# Patient Record
Sex: MALE | Race: WHITE | Employment: FULL TIME | URBAN - METROPOLITAN AREA
[De-identification: names, ages, dates, MRNs, and addresses within clinical notes are randomized per-mention and may not be internally consistent; named-entity substitution may affect disease eponyms.]

---

## 2017-12-08 ENCOUNTER — OFFICE VISIT (OUTPATIENT)
Dept: FAMILY MEDICINE | Facility: CLINIC | Age: 37
End: 2017-12-08
Payer: COMMERCIAL

## 2017-12-08 VITALS
RESPIRATION RATE: 24 BRPM | HEART RATE: 69 BPM | WEIGHT: 175 LBS | SYSTOLIC BLOOD PRESSURE: 128 MMHG | TEMPERATURE: 98.4 F | DIASTOLIC BLOOD PRESSURE: 82 MMHG | HEIGHT: 71 IN | BODY MASS INDEX: 24.5 KG/M2

## 2017-12-08 DIAGNOSIS — B07.0 PLANTAR WARTS: Primary | ICD-10-CM

## 2017-12-08 PROCEDURE — 17110 DESTRUCTION B9 LES UP TO 14: CPT | Performed by: PHYSICIAN ASSISTANT

## 2017-12-08 NOTE — MR AVS SNAPSHOT
After Visit Summary   12/8/2017    Vince Hatch    MRN: [id number]           Patient Information     Date Of Birth          1980        Visit Information        Provider Department      12/8/2017 1:40 PM Katlyn Ray PA-C [facility]        Care Instructions    Be aggressive! Cut away ALL tissue that doesn't hurt every night, then treat with a wart treatment (salicylic acid treatment), then cover. This will probably several months but will ultimately be successful.    You can also use duct tape: file or cut down all dead skin down to skin that bleeds, apply duct tape every night, take off in the morning, and repeat daily for at least 4 weeks (more likely longer).    For warts:  Mix 5 drops of [place] oil, 10 drops of lemon in 2 teaspoons of apple cider vinegar - apply twice daily until the wart is gone.    Return to clinic in (10 days - 4 weeks) for re-treatment if lesions fail to fully resolve.          Follow-ups after your visit        Who to contact     If you have questions or need follow up information about today's clinic visit or your schedule please contact [facility] directly at [phone].  Normal or non-critical lab and imaging results will be communicated to you by MyChart, letter or phone within 4 business days after the clinic has received the results. If you do not hear from us within 7 days, please contact the clinic through [employer] or phone. If you have a critical or abnormal lab result, we will notify you by phone as soon as possible.  Submit refill requests through [employer] or call your pharmacy and they will forward the refill request to us. Please allow 3 business days for your refill to be completed.          Additional Information About Your Visit        MyChart Information     [employer] Form gives you secure access to your electronic health record. If you see a primary care provider, you can also send messages to your care team and make  "appointments. If you have questions, please call your primary care clinic.  If you do not have a primary care provider, please call [phone] and they will assist you.        Care EveryWhere ID     This is your Care EveryWhere ID. This could be used by other organizations to access your [facility] medical records  [id number]        Your Vitals Were     Pulse Temperature Respirations Height BMI (Body Mass Index)       69 98.4  F (36.9  C) (Oral) 24 5' 11.25\" (1.81 m) 24.24 kg/m2        Blood Pressure from Last 3 Encounters:   12/08/17 128/82   04/06/16 122/80   02/17/15 134/82    Weight from Last 3 Encounters:   12/08/17 175 lb (79.4 kg)   04/06/16 178 lb (80.7 kg)   02/17/15 185 lb (83.9 kg)              Today, you had the following     No orders found for display       Primary Care Provider Office Phone # Fax #    Cici Avalos [phone] [address] [facility] [address]        Equal Access to Services     [facility]: Hadii cj ku hadasho Soyonis, waaxda luqadaha, qaybta kaalmada edwige, tracie dean . So [facility] [phone].    ATENCIÓN: Si habla español, tiene a mata disposición servicios gratuitos de asistencia lingüística. Comfort al [phone].    We comply with applicable federal civil rights laws and [place] laws. We do not discriminate on the basis of race, color, national origin, age, disability, sex, sexual orientation, or gender identity.            Thank you!     Thank you for choosing [facility]  for your care. Our goal is always to provide you with excellent care. Hearing back from our patients is one way we can continue to improve our services. Please take a few minutes to complete the written survey that you may receive in the mail after your visit with us. Thank you!             Your Updated Medication List - Protect others around you: Learn how to safely use, store and throw away your medicines at " www.disposemymeds.org.          This list is accurate as of: 12/8/17  2:00 PM.  Always use your most recent med list.                   Brand Name Dispense Instructions for use Diagnosis    amoxicillin 500 MG capsule    AMOXIL    30 capsule    Take 1 capsule (500 mg) by mouth 3 times daily    Acute recurrent maxillary sinusitis       cetirizine 10 MG tablet    zyrTEC    30 tablet    Take 1 tablet (10 mg) by mouth every evening        fish oil-omega-3 fatty acids 1000 MG capsule      Take 1,000 mg by mouth daily        fluticasone 50 MCG/ACT spray    FLONASE    1 Package    Spray 2 sprays into both nostrils daily    Seasonal allergic rhinitis       pseudoePHEDrine 30 MG tablet    SUDAFED    112 tablet    Take 1 tablet (30 mg) by mouth every 4 hours as needed for congestion

## 2017-12-08 NOTE — PROGRESS NOTES
SUBJECTIVE:   Vince Hatch is a 37 year old male who presents to clinic today for the following health issues:      WART(S)      Onset: 6 months    Description (location/number): right foot base of 2nd toe, total of 3    Accompanying signs and symptoms: Painful: YES- mild    History: prior warts: YES    Therapies tried and outcome: OTC freeze-does not help - last treated 2-3 weeks ago.        Problem list and histories reviewed & adjusted, as indicated.  Additional history: as documented    Patient Active Problem List   Diagnosis     Allergic state     Lumbosacral ligament sprain     Nonallopathic lesion of sacral region     CARDIOVASCULAR SCREENING; LDL GOAL LESS THAN 160     Hypertension goal BP (blood pressure) < 140/90     Hypertension     Racing heart beat     Palpitations     Past Surgical History:   Procedure Laterality Date     APPENDECTOMY  2000     ARTHROSCOPY KNEE Right 10/27/2014    Procedure: ARTHROSCOPY KNEE;  Surgeon: Germain Sanchez MD;  Location: [facility] OR     ARTHROSCOPY KNEE WITH MEDIAL MENISCECTOMY Right 10/27/2014    Procedure: ARTHROSCOPY KNEE WITH MEDIAL MENISCECTOMY;  Surgeon: Germain Sanchez MD;  Location: [facility] OR     COLONOSCOPY      Think in 2013     ENT SURGERY       GENITOURINARY SURGERY  2015    Kidney stone removal and stent       Social History   Substance Use Topics     Smoking status: Never Smoker     Smokeless tobacco: Never Used      Comment: socially/ocassionally      Alcohol use 0.0 oz/week      Comment: occ     Family History   Problem Relation Age of Onset     Aneurysm Paternal Grandfather 70     abdominal      Hypertension Father      Hyperlipidemia Father      Hypertension Maternal Grandmother      Heart Failure Maternal Grandmother      CANCER Maternal Grandfather          Current Outpatient Prescriptions   Medication Sig Dispense Refill     cetirizine (ZYRTEC) 10 MG tablet Take 1 tablet (10 mg) by mouth every evening 30 tablet 1     pseudoePHEDrine (SUDAFED) 30 MG  "tablet Take 1 tablet (30 mg) by mouth every 4 hours as needed for congestion 112 tablet      amoxicillin (AMOXIL) 500 MG capsule Take 1 capsule (500 mg) by mouth 3 times daily (Patient not taking: Reported on 12/8/2017) 30 capsule 0     fish oil-omega-3 fatty acids 1000 MG capsule Take 1,000 mg by mouth daily        fluticasone (FLONASE) 50 MCG/ACT nasal spray Spray 2 sprays into both nostrils daily (Patient not taking: Reported on 12/8/2017) 1 Package 11     No Known Allergies      Reviewed and updated as needed this visit by clinical staffTobacco  Allergies  Meds  Med Hx  Surg Hx  Fam Hx  Soc Hx      Reviewed and updated as needed this visit by Provider         ROS:  Constitutional, HEENT, cardiovascular, pulmonary, gi and gu systems are negative, except as otherwise noted.      OBJECTIVE:   /82 (BP Location: Left arm, Patient Position: Sitting, Cuff Size: Adult Regular)  Pulse 69  Temp 98.4  F (36.9  C) (Oral)  Resp 24  Ht 5' 11.25\" (1.81 m)  Wt 175 lb (79.4 kg)  BMI 24.24 kg/m2  Body mass index is 24.24 kg/(m^2).  GENERAL: healthy, alert and no distress  RESP: lungs clear to auscultation - no rales, rhonchi or wheezes  CV: regular rate and rhythm, normal S1 S2, no S3 or S4, no murmur, click or rub, no peripheral edema and peripheral pulses strong    WART:  3 dome shaped grey/brown hyperkeratotic lesion(s) with verrucous appearance, black dots on surface.  Located bottom of right foot.    PROCEDURE:  Lesions wiped with alcohol pad. Liquid nitrogen was applied for 10 seconds to the skin lesion(s) x 3 with intermediate thaw.  The expected redness, blistering or scabbing reaction was explained.  The patient was reminded of the risk of scarring from the procedure. Instructed to place duct tape over warts to help with resolution. The patient was instructed to return (10 days - 4 weeks) if lesions fail to fully resolve. Patient tolerated the procedure well.      Diagnostic Test Results:  none "     ASSESSMENT/PLAN:       ICD-10-CM    1. Plantar warts B07.0 DESTRUCT BENIGN LESION, UP TO 14       Patient Instructions   Be aggressive! Cut away ALL tissue that doesn't hurt every night, then treat with a wart treatment (salicylic acid treatment), then cover. This will probably several months but will ultimately be successful.    You can also use duct tape: file or cut down all dead skin down to skin that bleeds, apply duct tape every night, take off in the morning, and repeat daily for at least 4 weeks (more likely longer).    For warts:  Mix 5 drops of [place] oil, 10 drops of lemon in 2 teaspoons of apple cider vinegar - apply twice daily until the wart is gone.    Return to clinic in (10 days - 4 weeks) for re-treatment if lesions fail to fully resolve.      Katlyn Ray PA-C  [facility]

## 2017-12-08 NOTE — PATIENT INSTRUCTIONS
Be aggressive! Cut away ALL tissue that doesn't hurt every night, then treat with a wart treatment (salicylic acid treatment), then cover. This will probably several months but will ultimately be successful.    You can also use duct tape: file or cut down all dead skin down to skin that bleeds, apply duct tape every night, take off in the morning, and repeat daily for at least 4 weeks (more likely longer).    For warts:  Mix 5 drops of [place] oil, 10 drops of lemon in 2 teaspoons of apple cider vinegar - apply twice daily until the wart is gone.    Return to clinic in (10 days - 4 weeks) for re-treatment if lesions fail to fully resolve.

## 2018-01-29 ENCOUNTER — OFFICE VISIT (OUTPATIENT)
Dept: FAMILY MEDICINE | Facility: CLINIC | Age: 38
End: 2018-01-29
Payer: COMMERCIAL

## 2018-01-29 VITALS
OXYGEN SATURATION: 95 % | WEIGHT: 180 LBS | RESPIRATION RATE: 16 BRPM | HEART RATE: 80 BPM | HEIGHT: 71 IN | BODY MASS INDEX: 25.2 KG/M2 | TEMPERATURE: 97 F | DIASTOLIC BLOOD PRESSURE: 84 MMHG | SYSTOLIC BLOOD PRESSURE: 130 MMHG

## 2018-01-29 DIAGNOSIS — I10 HYPERTENSION GOAL BP (BLOOD PRESSURE) < 140/90: ICD-10-CM

## 2018-01-29 DIAGNOSIS — L30.9 DERMATITIS: Primary | ICD-10-CM

## 2018-01-29 PROCEDURE — 99213 OFFICE O/P EST LOW 20 MIN: CPT | Performed by: FAMILY MEDICINE

## 2018-01-29 NOTE — MR AVS SNAPSHOT
After Visit Summary   1/29/2018    Vince Hatch    MRN: [id number]           Patient Information     Date Of Birth          1980        Visit Information        Provider Department      1/29/2018 10:00 AM Cici Avalos MD [facility]        Today's Diagnoses     Dermatitis    -  1    Hypertension goal BP (blood pressure) < 140/90          Care Instructions    Try applying a small amount of over-the-counter strength hydrocortisone cream to the area under your eye twice daily for your rash. If this is not helping over the next few days, please schedule with dermatology or the skin care clinic. If getting worse, stop the cream and schedule with dermatology or skin care clinic. Avoid putting any other topical creams or lotions on the area except possibly aquaphor or vanicream for moisturizer.           Follow-ups after your visit        Additional Services     DERMATOLOGY REFERRAL       Your provider has referred you to:  N: [facility] [place] [phone]   [email or web address]    Please be aware that coverage of these services is subject to the terms and limitations of your health insurance plan.  Call member services at your health plan with any benefit or coverage questions.      Please bring the following with you to your appointment:    (1) Any X-Rays, CTs or MRIs which have been performed.  Contact the facility where they were done to arrange for  prior to your scheduled appointment.    (2) List of current medications  (3) This referral request   (4) Any documents/labs given to you for this referral            SKIN CARE REFERRAL       Your provider has referred you to: [facility]: [facility] [phone] (Dr. Manuel Nguyen Jr.)   [email or web address]  [facility]: [facility] [place] [phone]   [email or web address]     Please be  aware that coverage of these services is subject to the terms and limitations of your health insurance plan.  Please check with your insurance prior to the appointment to ensure appropriate coverage for any services considered cosmetic in nature or not medically necessary.    Please bring the following with you to your appointment:    (1) Any X-Rays, CTs or MRIs which have been performed.  Contact the facility where they were done to arrange for  prior to your scheduled appointment.  Any new CT, MRI or other procedures ordered by your specialist must be performed at a [facility] facility or coordinated by your clinic's referral office.  (2) List of current medications  (3) This referral request   (4) Any documents/labs given to you for this referral                  Who to contact     If you have questions or need follow up information about today's clinic visit or your schedule please contact [facility] directly at [phone].  Normal or non-critical lab and imaging results will be communicated to you by MyChart, letter or phone within 4 business days after the clinic has received the results. If you do not hear from us within 7 days, please contact the clinic through [employer] or phone. If you have a critical or abnormal lab result, we will notify you by phone as soon as possible.  Submit refill requests through [employer] or call your pharmacy and they will forward the refill request to us. Please allow 3 business days for your refill to be completed.          Additional Information About Your Visit        MyChart Information     [employer] gives you secure access to your electronic health record. If you see a primary care provider, you can also send messages to your care team and make appointments. If you have questions, please call your primary care clinic.  If you do not have a primary care provider, please call [phone] and they will assist you.        Care EveryWhere ID     This is your  "Care EveryWhere ID. This could be used by other organizations to access your [facility] medical records  [id number]        Your Vitals Were     Pulse Temperature Respirations Height Pulse Oximetry BMI (Body Mass Index)    80 97  F (36.1  C) (Tympanic) 16 5' 11.25\" (1.81 m) 95% 24.93 kg/m2       Blood Pressure from Last 3 Encounters:   01/29/18 130/84   12/08/17 128/82   04/06/16 122/80    Weight from Last 3 Encounters:   01/29/18 180 lb (81.6 kg)   12/08/17 175 lb (79.4 kg)   04/06/16 178 lb (80.7 kg)              We Performed the Following     DERMATOLOGY REFERRAL     SKIN CARE REFERRAL        Primary Care Provider Office Phone # Fax #    Cici Avalos [phone] [address]  [facility] [address]        Equal Access to Services     [facility]: Hadii aad ku hadasho Soomaali, waaxda luqadaha, qaybta kaalmada adeegyada, waxay idiin hayzoraidan francy dickinsonn . So [facility] [phone].    ATENCIÓN: Si habla español, tiene a mata disposición servicios gratuitos de asistencia lingüística. Comfort al [phone].    We comply with applicable federal civil rights laws and [place] laws. We do not discriminate on the basis of race, color, national origin, age, disability, sex, sexual orientation, or gender identity.            Thank you!     Thank you for choosing [facility]  for your care. Our goal is always to provide you with excellent care. Hearing back from our patients is one way we can continue to improve our services. Please take a few minutes to complete the written survey that you may receive in the mail after your visit with us. Thank you!             Your Updated Medication List - Protect others around you: Learn how to safely use, store and throw away your medicines at www.disposemymeds.org.          This list is accurate as of 1/29/18 10:31 AM.  Always use your most recent med list.                   Brand Name Dispense Instructions for use Diagnosis    cetirizine " 10 MG tablet    zyrTEC    30 tablet    Take 1 tablet (10 mg) by mouth every evening        fish oil-omega-3 fatty acids 1000 MG capsule      Take 1,000 mg by mouth daily        fluticasone 50 MCG/ACT spray    FLONASE    1 Package    Spray 2 sprays into both nostrils daily    Seasonal allergic rhinitis       pseudoePHEDrine 30 MG tablet    SUDAFED    112 tablet    Take 1 tablet (30 mg) by mouth every 4 hours as needed for congestion

## 2018-01-29 NOTE — PROGRESS NOTES
SUBJECTIVE:   Vince Hatch is a 37 year old male who presents to clinic today for the following health issues:      Rash  Onset: couple weeks     Description:   Location: under right eye   Character: round, raised, red  Itching (Pruritis): YES    Progression of Symptoms:  same    Accompanying Signs & Symptoms:  Fever: no   Body aches or joint pain: no   Sore throat symptoms: no   Recent cold symptoms: YES    History:   Previous similar rash: no     Precipitating factors:   Exposure to similar rash: no   New exposures: None     Alleviating factors:  None     Therapies Tried and outcome: none         Three weeks ago his right eye began to feel dry and formed bumps that are red below it. He says that the swelling fluctuates under eye. He describes them as pimples but they do not form a white head. He says that 6 months ago his wife changed products to ones that are gluten-free because she has Celiac's disease and he began using those, but he has tried to keep them away from his mouth. The last few days, he has began using a oily lotion for the dryness under his eye, which has helped with the dryness. He says that the area sometimes feels itchy.      Problem list and histories reviewed & adjusted, as indicated.  Additional history: as documented    Patient Active Problem List   Diagnosis     Allergic state     Lumbosacral ligament sprain     Nonallopathic lesion of sacral region     CARDIOVASCULAR SCREENING; LDL GOAL LESS THAN 160     Hypertension goal BP (blood pressure) < 140/90     Hypertension     Racing heart beat     Palpitations     Past Surgical History:   Procedure Laterality Date     APPENDECTOMY  2000     ARTHROSCOPY KNEE Right 10/27/2014    Procedure: ARTHROSCOPY KNEE;  Surgeon: Germain Sanchez MD;  Location:  OR     ARTHROSCOPY KNEE WITH MEDIAL MENISCECTOMY Right 10/27/2014    Procedure: ARTHROSCOPY KNEE WITH MEDIAL MENISCECTOMY;  Surgeon: Germain Sanchez MD;  Location:  OR     COLONOSCOPY       [facility] in 2013     ENT SURGERY       GENITOURINARY SURGERY  2015    Kidney stone removal and stent       Social History   Substance Use Topics     Smoking status: Never Smoker     Smokeless tobacco: Never Used      Comment: socially/ocassionally      Alcohol use 0.0 oz/week      Comment: occ     Family History   Problem Relation Age of Onset     Aneurysm Paternal Grandfather 70     abdominal      Hypertension Father      Hyperlipidemia Father      Hypertension Maternal Grandmother      Heart Failure Maternal Grandmother      CANCER Maternal Grandfather          Current Outpatient Prescriptions   Medication Sig Dispense Refill     cetirizine (ZYRTEC) 10 MG tablet Take 1 tablet (10 mg) by mouth every evening 30 tablet 1     pseudoePHEDrine (SUDAFED) 30 MG tablet Take 1 tablet (30 mg) by mouth every 4 hours as needed for congestion 112 tablet      fish oil-omega-3 fatty acids 1000 MG capsule Take 1,000 mg by mouth daily        fluticasone (FLONASE) 50 MCG/ACT nasal spray Spray 2 sprays into both nostrils daily (Patient not taking: Reported on 12/8/2017) 1 Package 11     No Known Allergies  Recent Labs   Lab Test  06/22/15   1210  02/07/15   0630  12/15/14   1215  12/02/14   1212  04/23/12   1154   10/17/11   1132   LDL   --    --   136*   --    --    --   110   HDL   --    --   70   --    --    --   72   TRIG   --    --   71   --    --    --   56   ALT   --    --    --   33  19   --    --    CR   --   168  1.04  1.06  1.18   < >   --    GFRESTIMATED   --    --   81  79  72   < >   --    GFRESTBLACK   --    --   >90   GFR Calc    >90   GFR Calc    87   < >   --    POTASSIUM  4.3   --   4.8  4.6  4.2   < >   --    TSH   --    --    --   1.70   --    --    --     < > = values in this interval not displayed.      BP Readings from Last 3 Encounters:   01/29/18 130/84   12/08/17 128/82   04/06/16 122/80    Wt Readings from Last 3 Encounters:   01/29/18 81.6 kg (180 lb)   12/08/17 79.4 kg  "(175 lb)   04/06/16 80.7 kg (178 lb)        Reviewed and updated as needed this visit by clinical staff  Tobacco  Allergies  Meds  Med Hx  Surg Hx  Fam Hx  Soc Hx      Reviewed and updated as needed this visit by Provider       ROS:  See above.    This document serves as a record of the services and decisions personally performed and made by Cici Avalos MD. It was created on his/her behalf by Nathan Martin, trained medical scribe. The creation of this document is based the provider's statements to the medical scribes.    Scribe Nathan Martin, January 29, 2018    OBJECTIVE:     /84  Pulse 80  Temp 97  F (36.1  C) (Tympanic)  Resp 16  Ht 1.81 m (5' 11.25\")  Wt 81.6 kg (180 lb)  SpO2 95%  BMI 24.93 kg/m2  Body mass index is 24.93 kg/(m^2).  GENERAL: healthy, alert and no distress  EYES: Eyes grossly normal to inspection, PERRL and conjunctivae and sclerae normal  SKIN: cluster of tiny pink papules is present below lower left eyelid, none at the lid line. No scale present. No vesicles.     Diagnostic Test Results:  none     ASSESSMENT/PLAN:   1. Dermatitis  See pt instructions for recommendations reviewed with pt. Referral given to derm/skin care clinic if symptoms do not improve or if worsening. No clear trigger, but did start using new facial cream about 6 months ago, which he stopped after the rash appeared.     2. Hypertension goal BP (blood pressure) < 140/90  Controlled       Patient Instructions   Try applying a small amount of over-the-counter strength hydrocortisone cream to the area under your eye twice daily for your rash. If this is not helping over the next few days, please schedule with dermatology or the skin care clinic. If getting worse, stop the cream and schedule with dermatology or skin care clinic. Avoid putting any other topical creams or lotions on the area except possibly aquaphor or vanicream for moisturizer.       The information in this document, created by the " medical scribe for me, accurately reflects the services I personally performed and the decisions made by me. I have reviewed and approved this document for accuracy. 01/29/18    Cici Avalos MD  [facility]

## 2018-08-14 NOTE — PATIENT INSTRUCTIONS
Try applying a small amount of over-the-counter strength hydrocortisone cream to the area under your eye twice daily for your rash. If this is not helping over the next few days, please schedule with dermatology or the skin care clinic. If getting worse, stop the cream and schedule with dermatology or skin care clinic. Avoid putting any other topical creams or lotions on the area except possibly aquaphor or vanicream for moisturizer.    coffee

## 2019-12-15 ENCOUNTER — HEALTH MAINTENANCE LETTER (OUTPATIENT)
Age: 39
End: 2019-12-15

## 2020-12-04 ENCOUNTER — TRANSFERRED RECORDS (OUTPATIENT)
Dept: HEALTH INFORMATION MANAGEMENT | Facility: CLINIC | Age: 40
End: 2020-12-04

## 2021-03-15 ENCOUNTER — OFFICE VISIT (OUTPATIENT)
Dept: FAMILY MEDICINE | Facility: CLINIC | Age: 41
End: 2021-03-15
Payer: COMMERCIAL

## 2021-03-15 VITALS
OXYGEN SATURATION: 98 % | HEIGHT: 71 IN | BODY MASS INDEX: 27.4 KG/M2 | DIASTOLIC BLOOD PRESSURE: 125 MMHG | SYSTOLIC BLOOD PRESSURE: 210 MMHG | HEART RATE: 98 BPM | WEIGHT: 195.7 LBS | TEMPERATURE: 98.8 F

## 2021-03-15 DIAGNOSIS — I10 ESSENTIAL HYPERTENSION: Primary | ICD-10-CM

## 2021-03-15 LAB
ALBUMIN SERPL-MCNC: 4.5 G/DL (ref 3.4–5)
ALP SERPL-CCNC: 100 U/L (ref 40–150)
ALT SERPL W P-5'-P-CCNC: 51 U/L (ref 0–70)
ANION GAP SERPL CALCULATED.3IONS-SCNC: 7 MMOL/L (ref 3–14)
AST SERPL W P-5'-P-CCNC: 24 U/L (ref 0–45)
BILIRUB SERPL-MCNC: 0.7 MG/DL (ref 0.2–1.3)
BUN SERPL-MCNC: 19 MG/DL (ref 7–30)
CALCIUM SERPL-MCNC: 9.8 MG/DL (ref 8.5–10.1)
CHLORIDE SERPL-SCNC: 102 MMOL/L (ref 94–109)
CO2 SERPL-SCNC: 27 MMOL/L (ref 20–32)
CREAT SERPL-MCNC: 1.1 MG/DL (ref 0.66–1.25)
GFR SERPL CREATININE-BSD FRML MDRD: 82 ML/MIN/{1.73_M2}
GLUCOSE SERPL-MCNC: 100 MG/DL (ref 70–99)
POTASSIUM SERPL-SCNC: 4.2 MMOL/L (ref 3.4–5.3)
PROT SERPL-MCNC: 8.3 G/DL (ref 6.8–8.8)
SODIUM SERPL-SCNC: 136 MMOL/L (ref 133–144)
TSH SERPL DL<=0.005 MIU/L-ACNC: 2.02 MU/L (ref 0.4–4)

## 2021-03-15 PROCEDURE — 80050 GENERAL HEALTH PANEL: CPT | Performed by: PHYSICIAN ASSISTANT

## 2021-03-15 PROCEDURE — 36415 COLL VENOUS BLD VENIPUNCTURE: CPT | Performed by: PHYSICIAN ASSISTANT

## 2021-03-15 PROCEDURE — 99204 OFFICE O/P NEW MOD 45 MIN: CPT | Performed by: PHYSICIAN ASSISTANT

## 2021-03-15 RX ORDER — LISINOPRIL 10 MG/1
20 TABLET ORAL DAILY
Qty: 60 TABLET | Refills: 1 | Status: SHIPPED | OUTPATIENT
Start: 2021-03-15 | End: 2021-03-15

## 2021-03-15 RX ORDER — CHLORTHALIDONE 25 MG/1
25 TABLET ORAL DAILY
Qty: 30 TABLET | Refills: 1 | Status: SHIPPED | OUTPATIENT
Start: 2021-03-15 | End: 2021-05-10

## 2021-03-15 ASSESSMENT — MIFFLIN-ST. JEOR: SCORE: 1818.78

## 2021-03-15 NOTE — PATIENT INSTRUCTIONS
Patient Education     Prevention Guidelines, Men Ages 40 to 49  Screening tests and vaccines are an important part of managing your health. A screening test is done to find possible disorders or diseases in people who don't have any symptoms. The goal is to find a disease early so lifestyle changes can be made and you can be watched more closely to reduce the risk of disease, or to detect it early enough to treat it most effectively. Screening tests are not considered diagnostic, but are used to determine if more testing is needed. Health counseling is essential, too. Below are guidelines for these, for men ages 40 to 49. Talk with your healthcare provider to make sure you re up to date on what you need.  Screening Who needs it How often   Alcohol misuse All men in this age group At routine exams   Blood pressure All men in this age group Yearly checkup if your blood pressure reading is normal  Normal blood pressure is less than 120/80 mm Hg  If your blood pressure is higher than normal, follow the advice of your healthcare provider      Depression All men in this age group At routine exams   Type 2 diabetes or prediabetes All men beginning at age 45 and men  without symptoms at any age who are overweight or obese and have 1 or more other risk factors for diabetes At least every 3 years (yearly if blood sugar has begun to rise)   Type 2 diabetes All men with prediabetes Every year   Hepatitis C Men at increased risk for infection - talk with your healthcare provider At routine exams   High cholesterol or triglycerides All men ages 35 and older, and younger men at high risk for coronary artery disease At least every 5 years   HIV All men At routine exams   Obesity All men in this age group At routine exams   Prostate cancer Starting at age 45, talk to healthcare provider about risks and benefits of digital rectal exam (NABILA) and prostate-specific antigen (PSA) screening1 At routine exams   Syphilis Men at increased  risk for infection - talk with your healthcare provider At routine exams   Tuberculosis Men at increased risk for infection - talk with your healthcare provider Check with your healthcare provider   Vision All men in this age group Every 2 to 4 years if no risk factors for eye disease2   Vaccine Who needs it How often   Chickenpox (varicella) All men in this age group who have no record of this infection or vaccine 2 doses; the second dose should be given at least 4 weeks after the first dose   Hepatitis A Men at increased risk for infection - talk with your healthcare provider 2 doses given at least 6 months apart   Hepatitis B Men at increased risk for infection - talk with your healthcare provider 3 doses over 6 months; second dose should be given 1 month after the first dose; the third dose should be given at least 2 months after the second dose and at least 4 months after the first dose   Haemophilus influenzae Type B (HIB) Men at increased risk for infection - talk with your healthcare provider 1 to 3 doses   Influenza (flu) All men in this age group Once a year   Measles, mumps, rubella (MMR) All men in this age group who have no record of these infections or vaccines 1 or 2 doses   Meningococcal Men at increased risk for infection - talk with your healthcare provider 1 or more doses   Pneumococcal conjugate vaccine (PCV13) and pneumococcal polysaccharide vaccine (PPSV23) Men at increased risk for infection - talk with your healthcare provider PCV13: 1 dose ages 19 to 65 (protects against 13 types of pneumococcal bacteria)     PPSV23: 1 to 2 doses through age 64, or 1 dose at 65 or older (protects against 23 types of pneumococcal bacteria)      Tetanus/diphtheria/  pertussis (Td/Tdap) booster All men in this age group Td every 10 years, or a one-time dose of Tdap instead of a Td booster after age 18, then Td every 10 years   Counseling Who needs it How often   Diet and exercise Men who are overweight or obese  When diagnosed, and then at routine exams   Sexually transmitted infection prevention Men at increased risk for infection - talk with your healthcare provider At routine exams   Use of daily aspirin Men ages 45 to 79 at risk for cardiovascular health problems At routine exams   Use of tobacco and the health effects it can cause All men in this age group Every exam   [address] [place] [address] [facility]   [facility] [employer]  [employer] last reviewed this educational content on 2/1/2017 2000-2020 [employer]. All rights reserved. This information is not intended as a substitute for professional medical care. Always follow your healthcare professional's instructions.           Patient Education     Discharge Instructions for High Blood Pressure (Hypertension)  You have been diagnosed with high blood pressure (hypertension). This means the force of blood against your artery walls is too strong. It also means your heart is working hard to move blood. High blood pressure usually has no symptoms, but over time, it can cause serious health problems. High blood pressure raises your risk for heart attack, stroke, heart disease, heart failure, kidney disease, and vision loss. With help from your doctor, you can manage your blood pressure and protect your health.  Blood pressure measurements are given as 2 numbers. Systolic blood pressure is the upper number. This is the pressure when the heart contracts or pumps. Diastolic blood pressure is the lower number. This is the pressure when the heart relaxes between beats.  Blood pressure is categorized as normal, elevated, or stage 1 or stage 2 high blood pressure:    Normal blood pressure is systolic of less than 120 and diastolic of less than 80 (120/80) at rest    Elevated blood pressure is systolic of 120 to 129 and diastolic less than 80 at rest    Stage 1 high blood pressure is systolic is 130 to 139 or diastolic between 80 to 89 at  rest    Stage 2 high blood pressure is when systolic is 140 or higher or the diastolic is 90 or higher at rest  Taking medicine    Learn to measure your own blood pressure. Keep a record of your results. Ask your doctor which readings mean that you need medical attention.    Take your blood pressure medicine exactly as directed. Don t skip doses. Missing doses can cause your blood pressure to get out of control.    If you do miss a dose (or doses) check with your healthcare provider about what to do.    Don't take medicines that contain heart stimulants, including over-the-counter medicines. Check for warnings about high blood pressure on the label. Ask the pharmacist before purchasing something you haven't used before    Check with your doctor or pharmacist before taking a decongestant such as pseudoephedrine or phenylephrine. Some decongestants can worsen high blood pressure.    Lifestyle changes    Stay at a healthy weight. Get help to lose any extra pounds (kilograms). Often times meeting with a dietitian can help you identify changes that can be made to your diet to help with weight loss.    Cut back on salt.  ? Limit canned, dried, packaged, and fast foods.  ? Don t add salt to your food at the table.  ? Season foods with herbs instead of salt when you cook.  ? Request no added salt when you go to a restaurant.  ? The American Heart Association (AHA) says the ideal amount of sodium is no more than 1,500 mg a day.  But because Americans eat so much salt, you can make a positive change by cutting back to even 2,300 mg of sodium a day (1 teaspoon).     Follow the DASH (Dietary Approaches to Stop Hypertension) eating plan. This plan recommends vegetables, fruits, whole gains, and other heart healthy foods.    Eat food rich in potassium.    Begin an exercise program. Ask your healthcare provider how to get started. The AHA recommends aerobic exercise 3 to 4 times a week for an average of 40 minutes at a time to  lower blood pressure, with your provider's approval. Simple activities such as walking or gardening can help.    Break the smoking habit. Enroll in a stop-smoking program to improve your chances of success. Ask your healthcare provider about programs and medicines to help you stop smoking.    Limit drinks that contain caffeine such as coffee, black or green tea, and cola to 2 per day.    Never take stimulants such as amphetamines or cocaine. These drugs can be deadly for someone with high blood pressure.    Control your stress. Learn ways to manage stress.    Limit alcohol to no more than 1 drink a day for women and 2 drinks a day for men.    Follow-up care  Make a follow-up appointment as directed.  When to call your healthcare provider  Call your healthcare provider right away if you have any of the following:    Chest pain or shortness of breath ( call 911)    Moderate to severe headache    Weakness in the muscles of your face, arms, or legs    Trouble speaking    Extreme drowsiness    Confusion    Fainting or dizziness    Pulsating or rushing sound in your ears    Unexplained nosebleed    Weakness, tingling, or numbness of your face, arms, or legs    Change in vision    Blood pressure measured at home that is greater than 180/110  [employer] last reviewed this educational content on 8/1/2019 2000-2020 [employer]. All rights reserved. This information is not intended as a substitute for professional medical care. Always follow your healthcare professional's instructions.

## 2021-03-15 NOTE — PROGRESS NOTES
Chief Complaint   Patient presents with     Hypertension     blood plassure was this morning 180/120.       ASSESSMENT/PLAN:  Vince was seen today for hypertension.    Diagnoses and all orders for this visit:    Essential hypertension  -     Discontinue: lisinopril (ZESTRIL) 10 MG tablet; Take 2 tablets (20 mg) by mouth daily Start by taking 1 tablet for 7 days then 2 tablets if blood pressure is not within desired range  -     CBC with platelets differential  -     Comprehensive metabolic panel  -     TSH with free T4 reflex  -     chlorthalidone (HYGROTON) 25 MG tablet; Take 1 tablet (25 mg) by mouth daily    Patient is currently asymptomatic with respect to warning signs and hypertension.  Drawing labs to check for endorgan damage and before starting medication today.  We discussed first-line treatments including diuretics, ACE inhibitors and his previous medications.  Did not tolerate lisinopril and we will start him on chlorthalidone as this does have better kinetics than hydrochlorothiazide but had a significant change in price.  Discussed patient will likely need an increase in this medication dosage or addition of another agent until he is able to  improve his lifestyle but he will follow-up with his primary care provider in the next few weeks for further management.  He was given strict precautions that if he develops any new or worsening symptoms he is to go to the ER return to clinic immediately.  We will update him with his lab work    40 minutes spent on the date of the encounter doing chart review, history and exam, documentation and further activities as noted above    The plan of care was discussed with the patient. They understand and agree with the course of treatment prescribed. A printed summary was given including instructions and medications.    SUBJECTIVE:  Vince is a 40 year old male with a history of high blood pressure not currently taking any medication but has been on hydrochlorothiazide,  "chlorothiazide and amlodipine and tried lisinopril did not tolerate who presents to urgent care with elevated blood pressure.  This morning he woke up and had a loose stool and has felt a little off since then.  He denies any headache, nausea, vomiting, abdominal pain, diarrhea, dizziness, lightheadedness, shortness of breath or chest pain.  He states he has not been very active in his diet has not been as good as it could have been the last few months.  He took his blood pressure and it was 180 systolic and was still elevated but he took it a few minutes later.    ROS: Pertinent ROS neg other than the symptoms noted above in the HPI.     OBJECTIVE:  BP (!) 210/125   Pulse 98   Temp 98.8  F (37.1  C) (Oral)   Ht 1.81 m (5' 11.25\")   Wt 88.8 kg (195 lb 11.2 oz)   SpO2 98%   BMI 27.10 kg/m     GENERAL: healthy, alert and no distress  EYES: Eyes grossly normal to inspection  NECK: No carotid bruits  RESP: lungs clear to auscultation - no rales, rhonchi or wheezes  CV: regular rate and rhythm, normal S1 S2, no S3 or S4, no murmur, click or rub, no peripheral edema and peripheral pulses strong  ABDOMEN: soft, nontender, no masses and bowel sounds normal, no renal artery bruits  MS: no gross musculoskeletal defects noted, no edema    DIAGNOSTICS    Results for orders placed or performed in visit on 03/15/21   CBC with platelets differential     Status: Abnormal   Result Value Ref Range    WBC 4.5 4.0 - 11.0 10e9/L    RBC Count 5.32 4.4 - 5.9 10e12/L    Hemoglobin 17.6 13.3 - 17.7 g/dL    Hematocrit 51.5 40.0 - 53.0 %    MCV 97 78 - 100 fl    MCH 33.1 (H) 26.5 - 33.0 pg    MCHC 34.2 31.5 - 36.5 g/dL    RDW 12.2 10.0 - 15.0 %    Platelet Count 323 150 - 450 10e9/L    Diff Method Automated Method    Comprehensive metabolic panel     Status: Abnormal   Result Value Ref Range    Sodium 136 133 - 144 mmol/L    Potassium 4.2 3.4 - 5.3 mmol/L    Chloride 102 94 - 109 mmol/L    Carbon Dioxide 27 20 - 32 mmol/L    Anion Gap 7 " 3 - 14 mmol/L    Glucose 100 (H) 70 - 99 mg/dL    Urea Nitrogen 19 7 - 30 mg/dL    Creatinine 1.10 0.66 - 1.25 mg/dL    GFR Estimate 82 >60 mL/min/[1.73_m2]    GFR Estimate If Black >90 >60 mL/min/[1.73_m2]    Calcium 9.8 8.5 - 10.1 mg/dL    Bilirubin Total 0.7 0.2 - 1.3 mg/dL    Albumin 4.5 3.4 - 5.0 g/dL    Protein Total 8.3 6.8 - 8.8 g/dL    Alkaline Phosphatase 100 40 - 150 U/L    ALT 51 0 - 70 U/L    AST 24 0 - 45 U/L   TSH with free T4 reflex     Status: None   Result Value Ref Range    TSH 2.02 0.40 - 4.00 mU/L        Current Outpatient Medications   Medication     cetirizine (ZYRTEC) 10 MG tablet     fish oil-omega-3 fatty acids 1000 MG capsule     fluticasone (FLONASE) 50 MCG/ACT nasal spray     pseudoePHEDrine (SUDAFED) 30 MG tablet     No current facility-administered medications for this visit.       Patient Active Problem List   Diagnosis     Allergic state     Lumbosacral ligament sprain     Nonallopathic lesion of sacral region     CARDIOVASCULAR SCREENING; LDL GOAL LESS THAN 160     Hypertension goal BP (blood pressure) < 140/90     Hypertension     Racing heart beat     Palpitations      Past Medical History:   Diagnosis Date     Hypertension      Hypertension goal BP (blood pressure) < 140/90 12/8/2014     Palpitations      Racing heart beat      Uncomplicated asthma      Past Surgical History:   Procedure Laterality Date     APPENDECTOMY  2000     ARTHROSCOPY KNEE Right 10/27/2014    Procedure: ARTHROSCOPY KNEE;  Surgeon: Germain Sanchez MD;  Location: [facility] OR     ARTHROSCOPY KNEE WITH MEDIAL MENISCECTOMY Right 10/27/2014    Procedure: ARTHROSCOPY KNEE WITH MEDIAL MENISCECTOMY;  Surgeon: Germain Sanchez MD;  Location: [facility] OR     COLONOSCOPY      Think in 2013     ENT SURGERY       GENITOURINARY SURGERY  2015    Kidney stone removal and stent     Family History   Problem Relation Age of Onset     Aneurysm Paternal Grandfather 70        abdominal      Hypertension Father      Hyperlipidemia  Father      Hypertension Maternal Grandmother      Heart Failure Maternal Grandmother      Cancer Maternal Grandfather      Social History     Tobacco Use     Smoking status: Never Smoker     Smokeless tobacco: Never Used     Tobacco comment: socially/ocassionally    Substance Use Topics     Alcohol use: Yes     Alcohol/week: 0.0 standard drinks     Comment: jairon Brandon PA-C    The use of Dragon/Digital Path dictation services may have been used to construct the content in this note; any grammatical or spelling errors are non-intentional. Please contact the author of this note directly if you are in need of any clarification.

## 2021-03-16 LAB
BASOPHILS # BLD AUTO: 0 10E9/L (ref 0–0.2)
BASOPHILS NFR BLD AUTO: 1 %
DIFFERENTIAL METHOD BLD: ABNORMAL
EOSINOPHIL # BLD AUTO: 0 10E9/L (ref 0–0.7)
EOSINOPHIL NFR BLD AUTO: 1 %
ERYTHROCYTE [DISTWIDTH] IN BLOOD BY AUTOMATED COUNT: 12.2 % (ref 10–15)
HCT VFR BLD AUTO: 51.5 % (ref 40–53)
HGB BLD-MCNC: 17.6 G/DL (ref 13.3–17.7)
LYMPHOCYTES # BLD AUTO: 1.1 10E9/L (ref 0.8–5.3)
LYMPHOCYTES NFR BLD AUTO: 24 %
MCH RBC QN AUTO: 33.1 PG (ref 26.5–33)
MCHC RBC AUTO-ENTMCNC: 34.2 G/DL (ref 31.5–36.5)
MCV RBC AUTO: 97 FL (ref 78–100)
MONOCYTES # BLD AUTO: 0.4 10E9/L (ref 0–1.3)
MONOCYTES NFR BLD AUTO: 9 %
NEUTROPHILS # BLD AUTO: 3 10E9/L (ref 1.6–8.3)
NEUTROPHILS NFR BLD AUTO: 65 %
PLATELET # BLD AUTO: 323 10E9/L (ref 150–450)
RBC # BLD AUTO: 5.32 10E12/L (ref 4.4–5.9)
WBC # BLD AUTO: 4.5 10E9/L (ref 4–11)

## 2021-03-26 ENCOUNTER — OFFICE VISIT (OUTPATIENT)
Dept: FAMILY MEDICINE | Facility: CLINIC | Age: 41
End: 2021-03-26
Payer: COMMERCIAL

## 2021-03-26 VITALS
BODY MASS INDEX: 27.02 KG/M2 | OXYGEN SATURATION: 98 % | HEIGHT: 71 IN | DIASTOLIC BLOOD PRESSURE: 110 MMHG | HEART RATE: 74 BPM | WEIGHT: 193 LBS | SYSTOLIC BLOOD PRESSURE: 172 MMHG | TEMPERATURE: 97.3 F

## 2021-03-26 DIAGNOSIS — Z82.49 FAMILY HISTORY OF ESSENTIAL HYPERTENSION: ICD-10-CM

## 2021-03-26 DIAGNOSIS — I10 ESSENTIAL HYPERTENSION: Primary | ICD-10-CM

## 2021-03-26 DIAGNOSIS — J45.909 UNCOMPLICATED ASTHMA, UNSPECIFIED ASTHMA SEVERITY, UNSPECIFIED WHETHER PERSISTENT: ICD-10-CM

## 2021-03-26 DIAGNOSIS — J30.2 SEASONAL ALLERGIC RHINITIS, UNSPECIFIED TRIGGER: ICD-10-CM

## 2021-03-26 DIAGNOSIS — E66.3 OVERWEIGHT: ICD-10-CM

## 2021-03-26 PROCEDURE — 99204 OFFICE O/P NEW MOD 45 MIN: CPT | Performed by: INTERNAL MEDICINE

## 2021-03-26 RX ORDER — AMLODIPINE BESYLATE 10 MG/1
10 TABLET ORAL DAILY
Qty: 90 TABLET | Refills: 3 | Status: SHIPPED | OUTPATIENT
Start: 2021-03-26 | End: 2022-03-11

## 2021-03-26 ASSESSMENT — MIFFLIN-ST. JEOR: SCORE: 1806.53

## 2021-03-26 NOTE — PROGRESS NOTES
Pat Clarke is a 40 year old who presents for the following health issues     HPI       Chief Complaint   Patient presents with     Hypertension       HPI:   Hypertension      Duration:     Since: chronic           Specific cause: overweight, positive family history of hypertension     Description:      Location of pain: N/A     Character of pain: N/A     Pain radiation: N/A    Intensity: worsening over the past several months    History:      symptoms interferes with job: no     History of similar problems: yes     Any previous MRI or X-rays: N/A     Therapies tried without relief: chlorthalidone    Alleviating factors:      Improved by: BP medications    Precipitating factors:    Worsened by: high salt intake in the diet    Accompanying Signs & Symptoms: none            Current Medications:     Current Outpatient Medications   Medication Sig Dispense Refill     amLODIPine (NORVASC) 10 MG tablet Take 1 tablet (10 mg) by mouth daily 90 tablet 3     cetirizine (ZYRTEC) 10 MG tablet Take 1 tablet (10 mg) by mouth every evening 30 tablet 1     chlorthalidone (HYGROTON) 25 MG tablet Take 1 tablet (25 mg) by mouth daily 30 tablet 1         Allergies:      Allergies   Allergen Reactions     Lisinopril Dizziness and Nausea and Vomiting            Past Medical History:     Past Medical History:   Diagnosis Date     Hypertension      Hypertension goal BP (blood pressure) < 140/90 12/8/2014     Palpitations      Racing heart beat      Uncomplicated asthma          Past Surgical History:     Past Surgical History:   Procedure Laterality Date     APPENDECTOMY  2000     ARTHROSCOPY KNEE Right 10/27/2014    Procedure: ARTHROSCOPY KNEE;  Surgeon: Germain Sanchez MD;  Location:  OR     ARTHROSCOPY KNEE WITH MEDIAL MENISCECTOMY Right 10/27/2014    Procedure: ARTHROSCOPY KNEE WITH MEDIAL MENISCECTOMY;  Surgeon: Germain Sanchez MD;  Location:  OR     COLONOSCOPY      [facility] in 2013     ENT SURGERY        GENITOURINARY SURGERY  2015    Kidney stone removal and stent         Family Medical History:     Family History   Problem Relation Age of Onset     Aneurysm Paternal Grandfather 70        abdominal      Hypertension Father      Hyperlipidemia Father      Hypertension Maternal Grandmother      Heart Failure Maternal Grandmother      Cancer Maternal Grandfather          Social History:     Social History     Socioeconomic History     Marital status:      Spouse name: Not on file     Number of children: Not on file     Years of education: Not on file     Highest education level: Not on file   Occupational History     Not on file   Social Needs     Financial resource strain: Not on file     Food insecurity     Worry: Not on file     Inability: Not on file     Transportation needs     Medical: Not on file     Non-medical: Not on file   Tobacco Use     Smoking status: Never Smoker     Smokeless tobacco: Never Used     Tobacco comment: socially/ocassionally    Substance and Sexual Activity     Alcohol use: Yes     Alcohol/week: 0.0 standard drinks     Comment: occ     Drug use: No     Sexual activity: Yes     Partners: Female     Birth control/protection: Male Surgical     Comment: Getting vasectomy middle of Dec 2017   Lifestyle     Physical activity     Days per week: Not on file     Minutes per session: Not on file     Stress: Not on file   Relationships     Social connections     Talks on phone: Not on file     Gets together: Not on file     Attends [employer] service: Not on file     Active member of club or organization: Not on file     Attends meetings of clubs or organizations: Not on file     Relationship status: Not on file     Intimate partner violence     Fear of current or ex partner: Not on file     Emotionally abused: Not on file     Physically abused: Not on file     Forced sexual activity: Not on file   Other Topics Concern     Parent/sibling w/ CABG, MI or angioplasty before 65F 55M? No      " Service Not Asked     Blood Transfusions Not Asked     Caffeine Concern No     Comment: has not had any last visit     Occupational Exposure Not Asked     Hobby Hazards Not Asked     Sleep Concern No     Stress Concern No     Weight Concern No     Special Diet Yes     Comment: low sodium     Back Care Not Asked     Exercise No     Bike Helmet Not Asked     Seat Belt Yes     Self-Exams Not Asked   Social History Narrative     Not on file           Review of System:     Constitutional: Negative for fever or chills  Skin: Negative for rashes  Ears/Nose/Throat: positive for allergic rhinitis  Respiratory: No shortness of breath, dyspnea on exertion, cough, or hemoptysis, positive for asthma  Cardiovascular: Negative for chest pain  Gastrointestinal: Negative for nausea, vomiting  Genitourinary: Negative for dysuria, hematuria  Musculoskeletal: Negative for myalgias  Neurologic: Negative for headaches  Psychiatric: Negative for depression, anxiety  Hematologic/Lymphatic/Immunologic: Negative  Endocrine: Negative  Behavioral: Negative for tobacco use       Physical Exam:   BP (!) 172/110 (BP Location: Right arm, Patient Position: Sitting, Cuff Size: Adult Regular)   Pulse 74   Temp 97.3  F (36.3  C) (Temporal)   Ht 1.81 m (5' 11.25\")   Wt 87.5 kg (193 lb)   SpO2 98%   BMI 26.73 kg/m      GENERAL: alert and no distress  EYES: eyes grossly normal to inspection, and conjunctivae and sclerae normal  HENT: Normocephalic atraumatic. Nose and mouth without ulcers or lesions  NECK: supple  RESP: lungs clear to auscultation   CV: regular rate and rhythm, normal S1 S2  LYMPH: no peripheral edema   ABDOMEN: nondistended  MS: no gross musculoskeletal defects noted  SKIN: no suspicious lesions or rashes  NEURO: Alert & Oriented x 3.   PSYCH: mentation appears normal, affect normal        Diagnostic Test Results:     Diagnostic Test Results:  Labs reviewed in Epic    ASSESSMENT/PLAN:       (Z82.49) Family history of " essential hypertension  (I10) Essential hypertension  (primary encounter diagnosis)  Comment: not well controlled  Plan: amLODIPine (NORVASC) 10 MG tablet  I have also advised the patient to avoid high salt intake in the diet going forward.     (E66.3) Overweight  Comment: chronic overweight  Plan: I have advised the patient to start a weight loss program through diet, exercise going forward, which should help improve his BP control also.      (J30.2) Seasonal allergic rhinitis, unspecified trigger  (J45.909) Uncomplicated asthma, unspecified asthma severity, unspecified whether persistent  Comment: stable. No cough or wheezing  Plan: continue current therapy        Follow Up Plan:     Patient is instructed to return to Internal Medicine clinic for follow-up visit in 1 month.        Jaye Martinez MD  Internal Medicine  [facility]

## 2021-03-27 PROBLEM — E66.3 OVERWEIGHT: Status: ACTIVE | Noted: 2021-03-27

## 2021-03-27 PROBLEM — J45.909 UNCOMPLICATED ASTHMA, UNSPECIFIED ASTHMA SEVERITY, UNSPECIFIED WHETHER PERSISTENT: Status: ACTIVE | Noted: 2021-03-27

## 2021-03-27 PROBLEM — Z82.49 FAMILY HISTORY OF ESSENTIAL HYPERTENSION: Status: ACTIVE | Noted: 2021-03-27

## 2021-04-01 ENCOUNTER — E-VISIT (OUTPATIENT)
Dept: URGENT CARE | Facility: URGENT CARE | Age: 41
End: 2021-04-01
Payer: COMMERCIAL

## 2021-04-01 DIAGNOSIS — J02.9 SORE THROAT: ICD-10-CM

## 2021-04-01 DIAGNOSIS — Z20.822 SUSPECTED COVID-19 VIRUS INFECTION: ICD-10-CM

## 2021-04-01 LAB
DEPRECATED S PYO AG THROAT QL EIA: NEGATIVE
LABORATORY COMMENT REPORT: NORMAL
SARS-COV-2 RNA RESP QL NAA+PROBE: NEGATIVE
SARS-COV-2 RNA RESP QL NAA+PROBE: NORMAL
SPECIMEN SOURCE: NORMAL
STREP GROUP A PCR: NOT DETECTED

## 2021-04-01 PROCEDURE — U0003 INFECTIOUS AGENT DETECTION BY NUCLEIC ACID (DNA OR RNA); SEVERE ACUTE RESPIRATORY SYNDROME CORONAVIRUS 2 (SARS-COV-2) (CORONAVIRUS DISEASE [COVID-19]), AMPLIFIED PROBE TECHNIQUE, MAKING USE OF HIGH THROUGHPUT TECHNOLOGIES AS DESCRIBED BY CMS-2020-01-R: HCPCS | Performed by: PHYSICIAN ASSISTANT

## 2021-04-01 PROCEDURE — U0005 INFEC AGEN DETEC AMPLI PROBE: HCPCS | Performed by: PHYSICIAN ASSISTANT

## 2021-04-01 PROCEDURE — 99421 OL DIG E/M SVC 5-10 MIN: CPT | Performed by: PHYSICIAN ASSISTANT

## 2021-04-01 PROCEDURE — 87651 STREP A DNA AMP PROBE: CPT | Performed by: PHYSICIAN ASSISTANT

## 2021-04-01 PROCEDURE — 99N1174 PR STATISTIC STREP A RAPID: Performed by: PHYSICIAN ASSISTANT

## 2021-04-01 NOTE — PATIENT INSTRUCTIONS
Dear Vince Hatch,    Your symptoms show that you may have coronavirus (COVID-19). This illness can cause fever, cough and trouble breathing. Many people get a mild case and get better on their own. Some people can get very sick.    Because you also reported sore throat I would like to also test you for Strep Throat to determine if we need to treat you for that as well.    What should I do?  We would like to test you for Covid-19 virus and Strep Throat. I have placed orders for these tests.     For all employees or close contacts (except [facility] and [facility] - see below), go to your [employer] home page and scroll down to the section that says  You have an appointment that needs to be scheduled  and click the large green button that says  Schedule Now  and follow the steps to find the next available opening.  It is important that when you are asked what the reason for your appointment is that you mention you need BOTH Covid and Strep tests.  tests.     If you are unable to complete these steps or if you cannot find any available times, please call [phone] to schedule employee testing.     [facility] employees or close contacts, please call [phone].   [facility] ([facility]) employees or close contacts call [phone].    Return to work/school/ guidance:  Please let your workplace manager and staffing office know when your quarantine ends     We can t give you an exact date as it depends on the above. You can calculate this on your own or work with your manager/staffing office to calculate this. (For example if you were exposed on 10/4, you would have to quarantine for 14 full days. That would be through 10/18. You could return on 10/19.)      If you receive a positive COVID-19 test result, follow the guidance of the those who are giving you the results. Usually the return to work is 10 (or in some cases 20 days from symptom onset.) If you work at [employer], you must also be cleared by Employee  Occupational Health and Safety to return to work.        If you receive a negative COVID-19 test result and did not have a high risk exposure to someone with a known positive COVID-19 test, you can return to work once you're free of fever for 24 hours without fever-reducing medication and your symptoms are improving or resolved.      If you receive a negative COVID-19 test and If you had a high risk exposure to someone who has tested positive for COVID-19 then you can return to work 14 days after your last contact with the positive individual    Note: If you have ongoing exposure to the covid positive person, this quarantine period may be more than 14 days. (For example, if you are continued to be exposed to your child who tested positive and cannot isolate from them, then the quarantine of 7-14 days can't start until your child is no longer contagious. This is typically 10 days from onset of the child's symptoms. So the total duration may be 17-24 days in this case.)    Sign up for [employer].   We know it's scary to hear that you might have COVID-19. We want to track your symptoms to make sure you're okay over the next 2 weeks. Please look for an email from [employer]--this is a free, online program that we'll use to keep in touch. To sign up, follow the link in the email you will receive. Learn more at [email or web address]    How can I take care of myself?    Get lots of rest. Drink extra fluids (unless a doctor has told you not to)    Take Tylenol (acetaminophen) or ibuprofen for fever or pain. If you have liver or kidney problems, ask your family doctor if it's okay to take Tylenol o ibuprofen    If you have other health problems (like cancer, heart failure, an organ transplant or severe kidney disease): Call your specialty clinic if you don't feel better in the next 2 days.    Know when to call 911. Emergency warning signs include:  o Trouble breathing or shortness of breath  o Pain or  pressure in the chest that doesn't go away  o Feeling confused like you haven't felt before, or not being able to wake up  o Bluish-colored lips or face    Where can I get more information?  [facility] - About COVID-19:   [email or web address]    CDC - What to Do If You're Sick:   www.cdc.gov/coronavirus/2019-ncov/about/steps-when-sick.html

## 2021-04-01 NOTE — LETTER
April 1, 2021      Vince Penamarianne  [address]  [facility] [address]        Dear ,    We are writing to inform you of your strep test results.    Your strep test results fall within the expected range(s) and it was negative. We will call you with PCR results only if positive. Enclosed is a copy of these result.      Resulted Orders   Streptococcus A Rapid Scr w Reflx to PCR   Result Value Ref [facility]    Strep Specimen Description Throat     Streptococcus Group A Rapid Screen Negative NEG^Negative      Comment:      No Group A streptococcal antigen detected by immunoassay. Confirmatory testing   in progress.         If you have any questions or concerns, please call the clinic at the number listed above.       Sincerely,      Kaleb Salcedo PA-C

## 2021-04-23 ENCOUNTER — OFFICE VISIT (OUTPATIENT)
Dept: FAMILY MEDICINE | Facility: CLINIC | Age: 41
End: 2021-04-23
Payer: COMMERCIAL

## 2021-04-23 VITALS
HEART RATE: 78 BPM | DIASTOLIC BLOOD PRESSURE: 97 MMHG | WEIGHT: 191 LBS | OXYGEN SATURATION: 97 % | HEIGHT: 71 IN | BODY MASS INDEX: 26.74 KG/M2 | SYSTOLIC BLOOD PRESSURE: 144 MMHG | TEMPERATURE: 97.7 F

## 2021-04-23 DIAGNOSIS — J30.2 SEASONAL ALLERGIC RHINITIS, UNSPECIFIED TRIGGER: ICD-10-CM

## 2021-04-23 DIAGNOSIS — E66.3 OVERWEIGHT: ICD-10-CM

## 2021-04-23 DIAGNOSIS — Z82.49 FAMILY HISTORY OF ESSENTIAL HYPERTENSION: ICD-10-CM

## 2021-04-23 DIAGNOSIS — I10 ESSENTIAL HYPERTENSION: Primary | ICD-10-CM

## 2021-04-23 PROCEDURE — 99214 OFFICE O/P EST MOD 30 MIN: CPT | Performed by: INTERNAL MEDICINE

## 2021-04-23 RX ORDER — LABETALOL 100 MG/1
100 TABLET, FILM COATED ORAL 2 TIMES DAILY
Qty: 180 TABLET | Refills: 3 | Status: SHIPPED | OUTPATIENT
Start: 2021-04-23 | End: 2022-04-15

## 2021-04-23 ASSESSMENT — MIFFLIN-ST. JEOR: SCORE: 1797.46

## 2021-04-23 NOTE — PROGRESS NOTES
Pat Clarke is a 40 year old who presents for the following health issues     HPI       Chief Complaint   Patient presents with     RECHECK       HPI:   Hypertension      Duration:     Since: chronic           Specific cause: overweight, positive family history of hypertension     Description:      Location of pain: N/A     Character of pain: N/A     Pain radiation: N/A    Intensity: worsening over the past several months    History:      symptoms interferes with job: no     History of similar problems: yes     Any previous MRI or X-rays: N/A     Therapies tried without relief: chlorthalidone    Alleviating factors:      Improved by: BP medications    Precipitating factors:    Worsened by: high salt intake in the diet    Accompanying Signs & Symptoms: none            Current Medications:     Current Outpatient Medications   Medication Sig Dispense Refill     amLODIPine (NORVASC) 10 MG tablet Take 1 tablet (10 mg) by mouth daily 90 tablet 3     cetirizine (ZYRTEC) 10 MG tablet Take 1 tablet (10 mg) by mouth every evening 30 tablet 1     chlorthalidone (HYGROTON) 25 MG tablet Take 1 tablet (25 mg) by mouth daily 30 tablet 1     labetalol (NORMODYNE) 100 MG tablet Take 1 tablet (100 mg) by mouth 2 times daily 180 tablet 3         Allergies:      Allergies   Allergen Reactions     Lisinopril Dizziness and Nausea and Vomiting            Past Medical History:     Past Medical History:   Diagnosis Date     Hypertension      Hypertension goal BP (blood pressure) < 140/90 12/8/2014     Palpitations      Racing heart beat      Uncomplicated asthma          Past Surgical History:     Past Surgical History:   Procedure Laterality Date     APPENDECTOMY  2000     ARTHROSCOPY KNEE Right 10/27/2014    Procedure: ARTHROSCOPY KNEE;  Surgeon: Germain Sanchez MD;  Location: [facility] OR     ARTHROSCOPY KNEE WITH MEDIAL MENISCECTOMY Right 10/27/2014    Procedure: ARTHROSCOPY KNEE WITH MEDIAL MENISCECTOMY;  Surgeon: Daniel  MD Germain;  Location: [facility] OR     COLONOSCOPY      [facility] in 2013     ENT SURGERY       GENITOURINARY SURGERY  2015    Kidney stone removal and stent         Family Medical History:     Family History   Problem Relation Age of Onset     Aneurysm Paternal Grandfather 70        abdominal      Hypertension Father      Hyperlipidemia Father      Hypertension Maternal Grandmother      Heart Failure Maternal Grandmother      Cancer Maternal Grandfather          Social History:     Social History     Socioeconomic History     Marital status:      Spouse name: Not on file     Number of children: Not on file     Years of education: Not on file     Highest education level: Not on file   Occupational History     Not on file   Social Needs     Financial resource strain: Not on file     Food insecurity     Worry: Not on file     Inability: Not on file     Transportation needs     Medical: Not on file     Non-medical: Not on file   Tobacco Use     Smoking status: Never Smoker     Smokeless tobacco: Never Used     Tobacco comment: socially/ocassionally    Substance and Sexual Activity     Alcohol use: Yes     Alcohol/week: 0.0 standard drinks     Comment: occ     Drug use: No     Sexual activity: Yes     Partners: Female     Birth control/protection: Male Surgical     Comment: Getting vasectomy middle of Dec 2017   Lifestyle     Physical activity     Days per week: Not on file     Minutes per session: Not on file     Stress: Not on file   Relationships     Social connections     Talks on phone: Not on file     Gets together: Not on file     Attends Orthodoxy service: Not on file     Active member of club or organization: Not on file     Attends meetings of clubs or organizations: Not on file     Relationship status: Not on file     Intimate partner violence     Fear of current or ex partner: Not on file     Emotionally abused: Not on file     Physically abused: Not on file     Forced sexual activity: Not on file   Other Topics  "Concern     Parent/sibling w/ CABG, MI or angioplasty before 65F 55M? No      Service Not Asked     Blood Transfusions Not Asked     Caffeine Concern No     Comment: has not had any last visit     Occupational Exposure Not Asked     Hobby Hazards Not Asked     Sleep Concern No     Stress Concern No     Weight Concern No     Special Diet Yes     Comment: low sodium     Back Care Not Asked     Exercise No     Bike Helmet Not Asked     Seat Belt Yes     Self-Exams Not Asked   Social History Narrative     Not on file           Review of System:     Constitutional: Negative for fever or chills  Skin: Negative for rashes  Ears/Nose/Throat: positive for allergic rhinitis  Respiratory: No shortness of breath, dyspnea on exertion, cough, or hemoptysis, positive for asthma  Cardiovascular: Negative for chest pain  Gastrointestinal: Negative for nausea, vomiting  Genitourinary: Negative for dysuria, hematuria  Musculoskeletal: Negative for myalgias  Neurologic: Negative for headaches  Psychiatric: Negative for depression, anxiety  Hematologic/Lymphatic/Immunologic: Negative  Endocrine: Negative  Behavioral: Negative for tobacco use       Physical Exam:   BP (!) 144/97 (BP Location: Right arm, Patient Position: Sitting, Cuff Size: Adult Large)   Pulse 78   Temp 97.7  F (36.5  C) (Temporal)   Ht 1.81 m (5' 11.25\")   Wt 86.6 kg (191 lb)   SpO2 97%   BMI 26.45 kg/m      GENERAL: alert and no distress  EYES: eyes grossly normal to inspection, and conjunctivae and sclerae normal  HENT: Normocephalic atraumatic. Nose and mouth without ulcers or lesions  NECK: supple  RESP: lungs clear to auscultation   CV: regular rate and rhythm, normal S1 S2  LYMPH: no peripheral edema   ABDOMEN: nondistended  MS: no gross musculoskeletal defects noted  SKIN: no suspicious lesions or rashes  NEURO: Alert & Oriented x 3.   PSYCH: mentation appears normal, affect normal        Diagnostic Test Results:     Diagnostic Test Results:  Labs " reviewed in Epic    ASSESSMENT/PLAN:       (Z82.49) Family history of essential hypertension  (I10) Essential hypertension  (primary encounter diagnosis)  Comment: still not well controlled  Plan: labetalol (NORMODYNE) 100 MG tablet    I have also advised the patient to avoid high salt intake in the diet going forward.     (E66.3) Overweight  Comment: chronic overweight  Plan: I have advised the patient to start a weight loss program through diet, exercise going forward, which should help improve his BP control also.    (J30.2) Seasonal allergic rhinitis, unspecified trigger  (J45.909) Uncomplicated asthma, unspecified asthma severity, unspecified whether persistent  Comment: stable. No cough or wheezing  Plan: continue current therapy        Follow Up Plan:     Patient is instructed to return to Internal Medicine clinic for follow-up visit in 1 month.        Jaye Martinez MD  Internal Medicine  [facility]

## 2021-04-24 ASSESSMENT — ASTHMA QUESTIONNAIRES: ACT_TOTALSCORE: 25

## 2021-05-09 ENCOUNTER — MYC MEDICAL ADVICE (OUTPATIENT)
Dept: FAMILY MEDICINE | Facility: CLINIC | Age: 41
End: 2021-05-09

## 2021-05-09 DIAGNOSIS — I10 ESSENTIAL HYPERTENSION: ICD-10-CM

## 2021-05-10 RX ORDER — CHLORTHALIDONE 25 MG/1
25 TABLET ORAL DAILY
Qty: 30 TABLET | Refills: 0 | Status: SHIPPED | OUTPATIENT
Start: 2021-05-10 | End: 2021-05-28

## 2021-05-28 ENCOUNTER — OFFICE VISIT (OUTPATIENT)
Dept: FAMILY MEDICINE | Facility: CLINIC | Age: 41
End: 2021-05-28
Payer: COMMERCIAL

## 2021-05-28 VITALS
BODY MASS INDEX: 27.02 KG/M2 | HEART RATE: 71 BPM | SYSTOLIC BLOOD PRESSURE: 136 MMHG | WEIGHT: 193 LBS | HEIGHT: 71 IN | OXYGEN SATURATION: 98 % | DIASTOLIC BLOOD PRESSURE: 90 MMHG | TEMPERATURE: 96.8 F

## 2021-05-28 DIAGNOSIS — I10 ESSENTIAL HYPERTENSION: ICD-10-CM

## 2021-05-28 DIAGNOSIS — E78.5 HYPERLIPIDEMIA LDL GOAL <100: ICD-10-CM

## 2021-05-28 DIAGNOSIS — Z00.00 ROUTINE HISTORY AND PHYSICAL EXAMINATION OF ADULT: Primary | ICD-10-CM

## 2021-05-28 LAB
CHOLEST SERPL-MCNC: 326 MG/DL
HDLC SERPL-MCNC: 81 MG/DL
LDLC SERPL CALC-MCNC: 222 MG/DL
NONHDLC SERPL-MCNC: 245 MG/DL
TRIGL SERPL-MCNC: 116 MG/DL

## 2021-05-28 PROCEDURE — 36415 COLL VENOUS BLD VENIPUNCTURE: CPT | Performed by: INTERNAL MEDICINE

## 2021-05-28 PROCEDURE — 99396 PREV VISIT EST AGE 40-64: CPT | Performed by: INTERNAL MEDICINE

## 2021-05-28 PROCEDURE — 80061 LIPID PANEL: CPT | Performed by: INTERNAL MEDICINE

## 2021-05-28 RX ORDER — CHLORTHALIDONE 25 MG/1
25 TABLET ORAL DAILY
Qty: 90 TABLET | Refills: 3 | Status: SHIPPED | OUTPATIENT
Start: 2021-05-28 | End: 2022-04-15

## 2021-05-28 ASSESSMENT — MIFFLIN-ST. JEOR: SCORE: 1801.53

## 2021-05-28 NOTE — LETTER
June 7, 2021      Vince Kaurrohith  [address]  [facility] [address]        Dear ,    We are writing to inform you of your test results.    Your lab result shows hyperlipidemia, advise diet, exercise for weight loss treatment. No change in meds.     Resulted Orders   Lipid panel reflex to direct LDL Fasting   Result Value Ref [facility]    Cholesterol 326 (H) <200 mg/dL      Comment:      Desirable:       <200 mg/dl    Triglycerides 116 <150 mg/dL      Comment:      Fasting specimen    HDL Cholesterol 81 >39 mg/dL    LDL Cholesterol Calculated 222 (H) <100 mg/dL      Comment:      Above desirable:  100-129 mg/dl  Borderline High:  130-159 mg/dL  High:             160-189 mg/dL  Very high:       >189 mg/dl      Non HDL Cholesterol 245 (H) <130 mg/dL      Comment:      Above Desirable:  130-159 mg/dl  Borderline high:  160-189 mg/dl  High:             190-219 mg/dl  Very high:       >219 mg/dl         If you have any questions or concerns, please call the clinic at the number listed above.       Sincerely,      Jaye Martinez MD/kw

## 2021-05-28 NOTE — LETTER
June 7, 2021      Vince Kaurrohith  [address]  [facility] [address]        Dear ,    We are writing to inform you of your test results.    Please notify pt that lab result shows hyperlipidemia, advise diet, exercise for weight loss treatment. No change in meds.     Resulted Orders   Lipid panel reflex to direct LDL Fasting   Result Value Ref [facility]    Cholesterol 326 (H) <200 mg/dL      Comment:      Desirable:       <200 mg/dl    Triglycerides 116 <150 mg/dL      Comment:      Fasting specimen    HDL Cholesterol 81 >39 mg/dL    LDL Cholesterol Calculated 222 (H) <100 mg/dL      Comment:      Above desirable:  100-129 mg/dl  Borderline High:  130-159 mg/dL  High:             160-189 mg/dL  Very high:       >189 mg/dl      Non HDL Cholesterol 245 (H) <130 mg/dL      Comment:      Above Desirable:  130-159 mg/dl  Borderline high:  160-189 mg/dl  High:             190-219 mg/dl  Very high:       >219 mg/dl         If you have any questions or concerns, please call the clinic at the number listed above.       Sincerely,      Jaye Martinez MD/kw

## 2021-05-28 NOTE — PROGRESS NOTES
SUBJECTIVE:   CC: Vince Hatch is an 41 year old male who presents for preventative health visit.     Patient has been advised of split billing requirements and indicates understanding: Yes  Healthy Habits:     Getting at least 3 servings of Calcium per day:  Yes    Bi-annual eye exam:  NO    Dental care twice a year:  NO    Sleep apnea or symptoms of sleep apnea:  None    Diet:  Regular (no restrictions)    Frequency of exercise:  2-3 days/week    Duration of exercise:  15-30 minutes    Taking medications regularly:  Yes    Barriers to taking medications:  None    Medication side effects:  None    PHQ-2 Total Score: 0    Additional concerns today:  No    Ability to successfully perform activities of daily living: Yes, no assistance needed  Home safety:  none identified   Hearing impairment: none      Today's PHQ-2 Score:   PHQ-2 ( 1999 Pfizer) 5/28/2021   Q1: Little interest or pleasure in doing things 0   Q2: Feeling down, depressed or hopeless 0   PHQ-2 Score 0       Abuse: Current or Past(Physical, Sexual or Emotional)- No  Do you feel safe in your environment? Yes    Have you ever done Advance Care Planning? (For example, a Health Directive, POLST, or a discussion with a medical provider or your loved ones about your wishes): No, advance care planning information given to patient to review.  Patient plans to discuss their wishes with loved ones or provider.      Social History     Tobacco Use     Smoking status: Never Smoker     Smokeless tobacco: Never Used     Tobacco comment: socially/ocassionally    Substance Use Topics     Alcohol use: Yes     Alcohol/week: 0.0 standard drinks     Comment: occ     If you drink alcohol do you typically have >3 drinks per day or >7 drinks per week? No    Alcohol Use 10/17/2011   Prescreen: >3 drinks/day or >7 drinks/week? The patient does not drink >3 drinks per day nor >7 drinks per week.       Last PSA: No results found for: PSA    Reviewed orders with patient. Reviewed  "health maintenance and updated orders accordingly - Yes  Labs reviewed in EPIC    Reviewed and updated as needed this visit by clinical staff   Allergies  Meds              Reviewed and updated as needed this visit by Provider                Past Medical History:   Diagnosis Date     Hypertension      Hypertension goal BP (blood pressure) < 140/90 12/8/2014     Palpitations      Racing heart beat      Uncomplicated asthma         Review of Systems  CONSTITUTIONAL: NEGATIVE for fever, chills, change in weight  INTEGUMENTARY/SKIN: NEGATIVE for worrisome rashes, moles or lesions  EYES: NEGATIVE for vision changes or irritation  ENT: NEGATIVE for ear, mouth and throat problems  RESP: NEGATIVE for significant cough or SOB  CV: NEGATIVE for chest pain, palpitations or peripheral edema  GI: NEGATIVE for nausea, abdominal pain, heartburn, or change in bowel habits   male: negative for dysuria, hematuria, decreased urinary stream, erectile dysfunction, urethral discharge  MUSCULOSKELETAL: NEGATIVE for significant arthralgias or myalgia  NEURO: NEGATIVE for weakness, dizziness or paresthesias  PSYCHIATRIC: NEGATIVE for changes in mood or affect    OBJECTIVE:   BP (!) 136/90 (BP Location: Right arm, Patient Position: Sitting, Cuff Size: Adult Regular)   Pulse 71   Temp 96.8  F (36  C) (Temporal)   Ht 1.81 m (5' 11.25\")   Wt 87.5 kg (193 lb)   SpO2 98%   BMI 26.73 kg/m      Physical Exam  GENERAL: healthy, alert and no distress  EYES: Eyes grossly normal to inspection, PERRL and conjunctivae and sclerae normal  HENT: ear canals and TM's normal, nose and mouth without ulcers or lesions  NECK: no adenopathy, no asymmetry, masses, or scars and thyroid normal to palpation  RESP: lungs clear to auscultation - no rales, rhonchi or wheezes  CV: regular rate and rhythm, normal S1 S2, no S3 or S4, no murmur, click or rub, no peripheral edema and peripheral pulses strong  ABDOMEN: soft, nontender, no hepatosplenomegaly, no " "masses and bowel sounds normal  MS: no gross musculoskeletal defects noted, no edema  SKIN: no suspicious lesions or rashes  NEURO: Normal strength and tone, mentation intact and speech normal  PSYCH: mentation appears normal, affect normal/bright    Diagnostic Test Results:  Labs reviewed in Epic    ASSESSMENT/PLAN:   (Z00.00) Routine history and physical examination of adult  (primary encounter diagnosis)  (I10) Essential hypertension  Comment: stable  Plan: chlorthalidone (HYGROTON) 25 MG tablet      (E78.5) Hyperlipidemia LDL goal <100  Comment: stable  Plan: Lipid panel reflex to direct LDL Fasting        Patient has been advised of split billing requirements and indicates understanding: Yes  COUNSELING:   Reviewed preventive health counseling, as reflected in patient instructions  Special attention given to:        Regular exercise       Healthy diet/nutrition    Estimated body mass index is 26.45 kg/m  as calculated from the following:    Height as of 4/23/21: 1.81 m (5' 11.25\").    Weight as of 4/23/21: 86.6 kg (191 lb).     Weight management plan: Discussed healthy diet and exercise guidelines    He reports that he has never smoked. He has never used smokeless tobacco.      Counseling Resources:  ATP IV Guidelines  Pooled Cohorts Equation Calculator  FRAX Risk Assessment  ICSI Preventive Guidelines  Dietary Guidelines for Americans, 2010  USDA's MyPlate  ASA Prophylaxis  Lung CA Screening    Jaye Martinez MD  [facility]  "

## 2021-05-29 ASSESSMENT — ASTHMA QUESTIONNAIRES: ACT_TOTALSCORE: 25

## 2021-09-12 ENCOUNTER — OFFICE VISIT (OUTPATIENT)
Dept: URGENT CARE | Facility: URGENT CARE | Age: 41
End: 2021-09-12
Payer: COMMERCIAL

## 2021-09-12 VITALS
DIASTOLIC BLOOD PRESSURE: 94 MMHG | TEMPERATURE: 99 F | SYSTOLIC BLOOD PRESSURE: 144 MMHG | BODY MASS INDEX: 26.6 KG/M2 | WEIGHT: 190 LBS | HEART RATE: 94 BPM | HEIGHT: 71 IN

## 2021-09-12 DIAGNOSIS — S61.211A LACERATION OF LEFT INDEX FINGER WITHOUT FOREIGN BODY WITHOUT DAMAGE TO NAIL, INITIAL ENCOUNTER: Primary | ICD-10-CM

## 2021-09-12 PROCEDURE — 99213 OFFICE O/P EST LOW 20 MIN: CPT | Mod: 25 | Performed by: PHYSICIAN ASSISTANT

## 2021-09-12 PROCEDURE — 90471 IMMUNIZATION ADMIN: CPT | Performed by: PHYSICIAN ASSISTANT

## 2021-09-12 PROCEDURE — 90715 TDAP VACCINE 7 YRS/> IM: CPT | Performed by: PHYSICIAN ASSISTANT

## 2021-09-12 ASSESSMENT — MIFFLIN-ST. JEOR: SCORE: 1783.96

## 2021-09-12 NOTE — PROGRESS NOTES
"URGENT CARE VISIT:    SUBJECTIVE:   Vince Hatch is a 41 year old male who presents to the clinic with a laceration on the left index finger sustained 10 hour(s) ago.  This is a non-work related injury.  Mechanism of injury: sharp metal.    Associated symptoms: Denies numbness, weakness, or loss of function  Last tetanus booster within 10 years: no    OBJECTIVE:  VITALS: BP (!) 144/94   Pulse 94   Temp 99  F (37.2  C) (Oral)   Ht 1.803 m (5' 11\")   Wt 86.2 kg (190 lb)   BMI 26.50 kg/m    General: WDWN in NAD  Size of laceration: 3 centimeters  Location of laceration: left 2nd mcp  Characteristics of the laceration: straight and well approximated with two steri strips  Tendon function intact: yes  Sensation to light touch intact: yes  Pulses intact: yes      ASSESSMENT:    ICD-10-CM    1. Laceration of left index finger without foreign body without damage to nail, initial encounter  S61.211A        PLAN:  Patient Instructions   Patient was educated on the natural course of injury. Past window frame for closure. Tdap updated. Keep wound dry and clean. Wash area with soap and water. Watch for signs of infection. Conservative measures discussed including over-the-counter Tylenol as needed for pain. See your primary care provider in 5 days if there is no improvement. Seek emergency care if you develop fever, streaking, severe pain or redness.     Patient verbalized understanding and is agreeable to plan. The patient was discharged ambulatory and in stable condition.    Evon Gold PA-C ....................  9/12/2021   9:42 AM   "

## 2021-09-12 NOTE — PATIENT INSTRUCTIONS
Patient was educated on the natural course of injury. Past window frame for closure. Tdap updated. Keep wound dry and clean. Wash area with soap and water. Watch for signs of infection. Conservative measures discussed including over-the-counter Tylenol as needed for pain. See your primary care provider in 5 days if there is no improvement. Seek emergency care if you develop fever, streaking, severe pain or redness.

## 2022-03-11 DIAGNOSIS — I10 ESSENTIAL HYPERTENSION: ICD-10-CM

## 2022-03-11 RX ORDER — AMLODIPINE BESYLATE 10 MG/1
TABLET ORAL
Qty: 30 TABLET | Refills: 1 | Status: SHIPPED | OUTPATIENT
Start: 2022-03-11 | End: 2022-04-07

## 2022-04-05 DIAGNOSIS — I10 ESSENTIAL HYPERTENSION: ICD-10-CM

## 2022-04-07 RX ORDER — AMLODIPINE BESYLATE 10 MG/1
TABLET ORAL
Qty: 30 TABLET | Refills: 1 | Status: SHIPPED | OUTPATIENT
Start: 2022-04-07 | End: 2022-04-15

## 2022-04-07 NOTE — TELEPHONE ENCOUNTER
Routing refill request to provider for review/approval because:  BP Readings from Last 3 Encounters:   09/12/21 (!) 144/94   05/28/21 (!) 136/90   04/23/21 (!) 144/97     Creatinine   Date Value Ref [facility] Status   03/15/2021 1.10 0.66 - 1.25 mg/dL Final     Appointment scheduled 4/15    Joni Garibay RN  [facility] Triage Nurse

## 2022-04-15 ENCOUNTER — OFFICE VISIT (OUTPATIENT)
Dept: FAMILY MEDICINE | Facility: CLINIC | Age: 42
End: 2022-04-15
Payer: COMMERCIAL

## 2022-04-15 VITALS
HEIGHT: 71 IN | OXYGEN SATURATION: 96 % | RESPIRATION RATE: 16 BRPM | HEART RATE: 70 BPM | SYSTOLIC BLOOD PRESSURE: 130 MMHG | TEMPERATURE: 97.7 F | BODY MASS INDEX: 28 KG/M2 | DIASTOLIC BLOOD PRESSURE: 87 MMHG | WEIGHT: 200 LBS

## 2022-04-15 DIAGNOSIS — J30.2 SEASONAL ALLERGIC RHINITIS, UNSPECIFIED TRIGGER: ICD-10-CM

## 2022-04-15 DIAGNOSIS — Z82.49 FAMILY HISTORY OF ESSENTIAL HYPERTENSION: ICD-10-CM

## 2022-04-15 DIAGNOSIS — I10 ESSENTIAL HYPERTENSION: ICD-10-CM

## 2022-04-15 DIAGNOSIS — J45.909 UNCOMPLICATED ASTHMA, UNSPECIFIED ASTHMA SEVERITY, UNSPECIFIED WHETHER PERSISTENT: ICD-10-CM

## 2022-04-15 DIAGNOSIS — Z76.0 ENCOUNTER FOR MEDICATION REFILL: Primary | ICD-10-CM

## 2022-04-15 DIAGNOSIS — E66.3 OVERWEIGHT: ICD-10-CM

## 2022-04-15 PROCEDURE — 99214 OFFICE O/P EST MOD 30 MIN: CPT | Performed by: INTERNAL MEDICINE

## 2022-04-15 RX ORDER — AMLODIPINE BESYLATE 10 MG/1
10 TABLET ORAL DAILY
Qty: 90 TABLET | Refills: 3 | Status: SHIPPED | OUTPATIENT
Start: 2022-04-15 | End: 2023-04-21

## 2022-04-15 RX ORDER — CHLORTHALIDONE 25 MG/1
25 TABLET ORAL DAILY
Qty: 90 TABLET | Refills: 3 | Status: SHIPPED | OUTPATIENT
Start: 2022-04-15 | End: 2023-04-21

## 2022-04-15 RX ORDER — LABETALOL 100 MG/1
100 TABLET, FILM COATED ORAL 2 TIMES DAILY
Qty: 180 TABLET | Refills: 3 | Status: SHIPPED | OUTPATIENT
Start: 2022-04-15 | End: 2022-11-25

## 2022-04-15 ASSESSMENT — ASTHMA QUESTIONNAIRES: ACT_TOTALSCORE: 25

## 2022-04-15 ASSESSMENT — PAIN SCALES - GENERAL: PAINLEVEL: NO PAIN (0)

## 2022-04-15 NOTE — PROGRESS NOTES
Pat Clarke is a 41 year old who presents for the following health issues     HPI     Follow up on hypertension, asthma        HPI:   Hypertension      Duration:     Since: chronic           Specific cause: overweight, positive family history of hypertension     Description:      Location of pain: N/A     Character of pain: N/A     Pain radiation: N/A    Intensity: worsening over the past several months    History:      symptoms interferes with job: no     History of similar problems: yes     Any previous MRI or X-rays: N/A     Therapies tried without relief: chlorthalidone    Alleviating factors:      Improved by: BP medications    Precipitating factors:    Worsened by: high salt intake in the diet    Accompanying Signs & Symptoms: none            Current Medications:     Current Outpatient Medications   Medication Sig Dispense Refill     amLODIPine (NORVASC) 10 MG tablet Take 1 tablet (10 mg) by mouth daily 90 tablet 3     cetirizine (ZYRTEC) 10 MG tablet Take 1 tablet (10 mg) by mouth every evening 30 tablet 1     chlorthalidone (HYGROTON) 25 MG tablet Take 1 tablet (25 mg) by mouth daily 90 tablet 3     labetalol (NORMODYNE) 100 MG tablet Take 1 tablet (100 mg) by mouth 2 times daily 180 tablet 3         Allergies:      Allergies   Allergen Reactions     Lisinopril Dizziness and Nausea and Vomiting            Past Medical History:     Past Medical History:   Diagnosis Date     Hypertension      Hypertension goal BP (blood pressure) < 140/90 12/8/2014     Palpitations      Racing heart beat      Uncomplicated asthma          Past Surgical History:     Past Surgical History:   Procedure Laterality Date     APPENDECTOMY  2000     ARTHROSCOPY KNEE Right 10/27/2014    Procedure: ARTHROSCOPY KNEE;  Surgeon: Germain Sanchez MD;  Location: [facility] OR     ARTHROSCOPY KNEE WITH MEDIAL MENISCECTOMY Right 10/27/2014    Procedure: ARTHROSCOPY KNEE WITH MEDIAL MENISCECTOMY;  Surgeon: Germain Sanchez MD;   Location: [facility] OR     COLONOSCOPY      Think in 2013     ENT SURGERY       GENITOURINARY SURGERY  2015    Kidney stone removal and stent         Family Medical History:     Family History   Problem Relation Age of Onset     Aneurysm Paternal Grandfather 70        abdominal      Hypertension Father      Hyperlipidemia Father      Hypertension Maternal Grandmother      Heart Failure Maternal Grandmother      Cancer Maternal Grandfather          Social History:     Social History     Socioeconomic History     Marital status:      Spouse name: Not on file     Number of children: Not on file     Years of education: Not on file     Highest education level: Not on file   Occupational History     Not on file   Tobacco Use     Smoking status: Never Smoker     Smokeless tobacco: Never Used     Tobacco comment: socially/ocassionally    Substance and Sexual Activity     Alcohol use: Yes     Alcohol/week: 0.0 standard drinks     Comment: occ     Drug use: No     Sexual activity: Yes     Partners: Female     Birth control/protection: Male Surgical     Comment: Getting vasectomy middle of Dec 2017   Other Topics Concern     Parent/sibling w/ CABG, MI or angioplasty before 65F 55M? No      Service Not Asked     Blood Transfusions Not Asked     Caffeine Concern No     Comment: has not had any last visit     Occupational Exposure Not Asked     Hobby Hazards Not Asked     Sleep Concern No     Stress Concern No     Weight Concern No     Special Diet Yes     Comment: low sodium     Back Care Not Asked     Exercise No     Bike Helmet Not Asked     Seat Belt Yes     Self-Exams Not Asked   Social History Narrative     Not on file     Social Determinants of Health     Financial Resource Strain: Not on file   Food Insecurity: Not on file   Transportation Needs: Not on file   Physical Activity: Not on file   Stress: Not on file   Social Connections: Not on file   Intimate Partner Violence: Not on file   Housing Stability: Not on  "file           Review of System:     Constitutional: Negative for fever or chills  Skin: Negative for rashes  Ears/Nose/Throat: positive for allergic rhinitis  Respiratory: No shortness of breath, dyspnea on exertion, cough, or hemoptysis, positive for asthma  Cardiovascular: Negative for chest pain  Gastrointestinal: Negative for nausea, vomiting  Genitourinary: Negative for dysuria, hematuria  Musculoskeletal: Negative for myalgias  Neurologic: Negative for headaches  Psychiatric: Negative for depression, anxiety  Hematologic/Lymphatic/Immunologic: Negative  Endocrine: Negative  Behavioral: Negative for tobacco use       Physical Exam:   /87 (BP Location: Right arm, Patient Position: Sitting, Cuff Size: Adult Large)   Pulse 70   Temp 97.7  F (36.5  C) (Temporal)   Resp 16   Ht 1.81 m (5' 11.25\")   Wt 90.7 kg (200 lb)   SpO2 96%   BMI 27.70 kg/m      GENERAL: alert and no distress  EYES: eyes grossly normal to inspection, and conjunctivae and sclerae normal  HENT: Normocephalic atraumatic. Nose and mouth without ulcers or lesions  NECK: supple  RESP: lungs clear to auscultation   CV: regular rate and rhythm, normal S1 S2  LYMPH: no peripheral edema   ABDOMEN: nondistended  MS: no gross musculoskeletal defects noted  SKIN: no suspicious lesions or rashes  NEURO: Alert & Oriented x 3.   PSYCH: mentation appears normal, affect normal        Diagnostic Test Results:     Diagnostic Test Results:  Labs reviewed in Epic    ASSESSMENT/PLAN:     (Z76.0) Encounter for medication refill  (primary encounter diagnosis)  (Z82.49) Family history of essential hypertension  (I10) Essential hypertension  Comment: still not well controlled  Plan: BP medication refilled today including labetalol (NORMODYNE) 100 MG tablet    I have also advised the patient to avoid high salt intake in the diet going forward.     (E66.3) Overweight  Comment: chronic overweight  Plan: I have advised the patient to start a weight loss program " through diet, exercise going forward, which should help improve his BP control also.    (J30.2) Seasonal allergic rhinitis, unspecified trigger  (J45.909) Uncomplicated asthma, unspecified asthma severity, unspecified whether persistent  Comment: stable. No cough or wheezing  Plan: continue current therapy      Follow Up Plan:     Patient is instructed to return to Internal Medicine clinic for follow-up visit in 6 to 12 months.        Jaye Martinez MD  Internal Medicine  [facility]

## 2022-11-14 ENCOUNTER — OFFICE VISIT (OUTPATIENT)
Dept: URGENT CARE | Facility: URGENT CARE | Age: 42
End: 2022-11-14
Payer: COMMERCIAL

## 2022-11-14 VITALS
HEART RATE: 104 BPM | SYSTOLIC BLOOD PRESSURE: 174 MMHG | OXYGEN SATURATION: 95 % | TEMPERATURE: 98.1 F | DIASTOLIC BLOOD PRESSURE: 101 MMHG

## 2022-11-14 DIAGNOSIS — I10 PRIMARY HYPERTENSION: ICD-10-CM

## 2022-11-14 DIAGNOSIS — R07.0 THROAT PAIN: Primary | ICD-10-CM

## 2022-11-14 LAB — DEPRECATED S PYO AG THROAT QL EIA: NEGATIVE

## 2022-11-14 PROCEDURE — 99213 OFFICE O/P EST LOW 20 MIN: CPT | Performed by: PHYSICIAN ASSISTANT

## 2022-11-14 PROCEDURE — 87651 STREP A DNA AMP PROBE: CPT | Performed by: PHYSICIAN ASSISTANT

## 2022-11-15 LAB — GROUP A STREP BY PCR: NOT DETECTED

## 2022-11-15 NOTE — PROGRESS NOTES
Primary hypertension  Blood pressure reviewed with patient. Patient was educated on BP goals and advised to monitor BP at home 2-3 times daily. Low salt diet recommended. Healthy diet and exercise reviewed.  Limit pop and juice intake.  Encourage exercise of at least 20 min per day.  Limit sedentary time to one hour a day. Mediterranean/pescatarian diet best for weight maintenance and cardiovascular health. Follow up with PCP in 1-3 months for any medication adjustments.     Throat pain  - Streptococcus A Rapid Screen w/Reflex to PCR - Clinic Collect  - Group A Streptococcus PCR Throat Swab    Okay to take acetaminophen 500 mg- 2 tabs (Total of 1000 mg) every 8 hrs   Okay to take ibuprofen 200 mg- 3 tabs (Total of 600 mg) every 6 hours        Newton Myers PA-C  [facility]    Subjective   42 year old who presents to clinic today for the following health issues:    Urgent Care       HPI     Acute Illness  Acute illness concerns: Sore throat   Onset/Duration: Fever   Symptoms:  Fever: No  Chills/Sweats: No  Headache (location?): No  Sinus Pressure: No  Conjunctivitis:  No  Ear Pain: no  Rhinorrhea: YES  Congestion: No  Sore Throat: YES  Cough: no  Wheeze: No  Decreased Appetite: No  Nausea: No  Vomiting: No  Diarrhea: No  Dysuria/Freq.: No  Dysuria or Hematuria: No  Fatigue/Achiness: No  Sick/Strep Exposure: No  Therapies tried and outcome: None     Review of Systems   Review of Systems   See HPI    Objective    Temp: 98.1  F (36.7  C) Temp src: Oral BP: (!) 174/101 Pulse: 104     SpO2: 95 %       Physical Exam   Physical Exam  Constitutional:       General: He is not in acute distress.     Appearance: Normal appearance. He is normal weight. He is not ill-appearing, toxic-appearing or diaphoretic.   HENT:      Head: Normocephalic and atraumatic.      Mouth/Throat:      Mouth: Mucous membranes are moist.      Pharynx: Posterior oropharyngeal erythema present. No oropharyngeal exudate.    Cardiovascular:      Rate and Rhythm: Normal rate.      Pulses: Normal pulses.   Pulmonary:      Effort: Pulmonary effort is normal. No respiratory distress.   Musculoskeletal:      Cervical back: Normal range of motion. No tenderness.   Lymphadenopathy:      Cervical: No cervical adenopathy.   Neurological:      Mental Status: He is alert.   Psychiatric:         Mood and Affect: Mood normal.         Behavior: Behavior normal.         Thought Content: Thought content normal.         Judgment: Judgment normal.          Results for orders placed or performed in visit on 11/14/22 (from the past 24 hour(s))   Streptococcus A Rapid Screen w/Reflex to PCR - Clinic Collect    Specimen: Throat; Swab   Result Value Ref Range    Group A Strep antigen Negative Negative

## 2022-11-21 ENCOUNTER — NURSE TRIAGE (OUTPATIENT)
Dept: FAMILY MEDICINE | Facility: CLINIC | Age: 42
End: 2022-11-21

## 2022-11-21 NOTE — TELEPHONE ENCOUNTER
"There are routing comments that will not be visible once message is \"doned\".  Need to schedule        "

## 2022-11-21 NOTE — TELEPHONE ENCOUNTER
"Pt sent mychart regarding elevated blood pressures in160- 180/100-108    Pt states BP starting to come down, but was still 165/101 today.  Did mention a slight headache for a couple days, but that has been relieved.  Denies all other sx.    Disposition would be same day office visit, but none are available, and no team appts tomorrow.  Would you be able to work in?  Please advise?       Pt's mychart as follows:   \"was in urgent care recently with a sick kids (strep/flu). I had a sore throat so got tested for strep. Noticed my BP was high at the visit. Been checking it since and running pretty high. Sys ranging from low 160 s to mid 180 s. Diastolic 100-108.   Wondering what you think?\"       Call back, ok for detailed vm [phone]    Reason for Disposition    Systolic BP >= 180 OR Diastolic >= 110    Additional Information    Negative: Symptom is main concern (e.g., headache, chest pain)    Negative: Low blood pressure is main concern    Negative: Sounds like a life-threatening emergency to the triager    Negative: Pregnant 20 or more weeks (or postpartum < 6 weeks) with new hand or face swelling    Negative: Pregnant 20 or more weeks (or postpartum < 6 weeks) and Systolic BP >= 160 OR Diastolic >= 100    Negative: Systolic BP >= 160 OR Diastolic >= 100, and any cardiac or neurologic symptoms (e.g., chest pain, difficulty breathing, unsteady gait, blurred vision)    Negative: Patient sounds very sick or weak to the triager    Negative: Systolic BP >= 200 OR Diastolic >= 120 and having NO cardiac or neurologic symptoms    Negative: Pregnant 20 or more weeks (or postpartum < 6 weeks) with Systolic BP >= 140 OR Diastolic >= 90    Negative: Systolic BP >= 180 OR Diastolic >= 110, and missed most recent dose of blood pressure medication    Protocols used: BLOOD PRESSURE - HIGH-A-OH      "

## 2022-11-25 ENCOUNTER — OFFICE VISIT (OUTPATIENT)
Dept: FAMILY MEDICINE | Facility: CLINIC | Age: 42
End: 2022-11-25
Payer: COMMERCIAL

## 2022-11-25 VITALS
RESPIRATION RATE: 16 BRPM | SYSTOLIC BLOOD PRESSURE: 133 MMHG | BODY MASS INDEX: 27.58 KG/M2 | DIASTOLIC BLOOD PRESSURE: 88 MMHG | HEART RATE: 67 BPM | OXYGEN SATURATION: 96 % | HEIGHT: 71 IN | WEIGHT: 197 LBS | TEMPERATURE: 97.7 F

## 2022-11-25 DIAGNOSIS — I10 ESSENTIAL HYPERTENSION: ICD-10-CM

## 2022-11-25 DIAGNOSIS — Z00.00 ROUTINE HISTORY AND PHYSICAL EXAMINATION OF ADULT: Primary | ICD-10-CM

## 2022-11-25 PROCEDURE — 99396 PREV VISIT EST AGE 40-64: CPT | Performed by: INTERNAL MEDICINE

## 2022-11-25 RX ORDER — LABETALOL 100 MG/1
300 TABLET, FILM COATED ORAL 2 TIMES DAILY
Qty: 540 TABLET | Refills: 3 | Status: SHIPPED | OUTPATIENT
Start: 2022-11-25 | End: 2023-10-20

## 2022-11-25 ASSESSMENT — PAIN SCALES - GENERAL: PAINLEVEL: NO PAIN (0)

## 2022-11-25 ASSESSMENT — ASTHMA QUESTIONNAIRES: ACT_TOTALSCORE: 25

## 2022-11-25 NOTE — PROGRESS NOTES
Subjective   Vince is a 42 year old presenting for the following health issues:      HPI     Follow up on multiple concerns including hypertension       Chief Complaint:         HPI:   Patient Vince Hatch is a very pleasant 42 year old male with history of *** who presents to Internal Medicine clinic today for ***. Regarding the patient's ***, the patient *** Regarding the patient's ***, the patient ***.       Pain    Duration:     Since:           Specific cause: none    Description:      Location of pain:      Character of pain:      Pain radiation:    Intensity:     History:      Pain interferes with job:      History of similar pain problems:      Any previous MRI or X-rays:      Therapies tried without relief:     Alleviating factors:      Improved by:     Precipitating factors:    Worsened by:     Accompanying Signs & Symptoms:            Current Medications:     Current Outpatient Medications   Medication Sig Dispense Refill     amLODIPine (NORVASC) 10 MG tablet Take 1 tablet (10 mg) by mouth daily 90 tablet 3     cetirizine (ZYRTEC) 10 MG tablet Take 10 mg by mouth every evening 30 tablet 1     chlorthalidone (HYGROTON) 25 MG tablet Take 1 tablet (25 mg) by mouth daily 90 tablet 3     labetalol (NORMODYNE) 100 MG tablet Take 3 tablets (300 mg) by mouth 2 times daily 540 tablet 3         Allergies:      Allergies   Allergen Reactions     Lisinopril Dizziness and Nausea and Vomiting            Past Medical History:     Past Medical History:   Diagnosis Date     Hypertension      Hypertension goal BP (blood pressure) < 140/90 12/8/2014     Palpitations      Racing heart beat      Uncomplicated asthma          Past Surgical History:     Past Surgical History:   Procedure Laterality Date     APPENDECTOMY  2000     ARTHROSCOPY KNEE Right 10/27/2014    Procedure: ARTHROSCOPY KNEE;  Surgeon: Germain aSnchez MD;  Location: [facility] OR     ARTHROSCOPY KNEE WITH MEDIAL MENISCECTOMY Right 10/27/2014    Procedure:  ARTHROSCOPY KNEE WITH MEDIAL MENISCECTOMY;  Surgeon: Germain Sanchez MD;  Location: [facility] OR     COLONOSCOPY      [facility] in 2013     ENT SURGERY       GENITOURINARY SURGERY  2015    Kidney stone removal and stent         Family Medical History:     Family History   Problem Relation Age of Onset     Aneurysm Paternal Grandfather 70        abdominal      Hypertension Father      Hyperlipidemia Father      Hypertension Maternal Grandmother      Heart Failure Maternal Grandmother      Cancer Maternal Grandfather          Social History:     Social History     Socioeconomic History     Marital status:      Spouse name: Not on file     Number of children: Not on file     Years of education: Not on file     Highest education level: Not on file   Occupational History     Not on file   Tobacco Use     Smoking status: Never     Smokeless tobacco: Never     Tobacco comments:     socially/ocassionally    Substance and Sexual Activity     Alcohol use: Yes     Alcohol/week: 0.0 standard drinks     Comment: occ     Drug use: No     Sexual activity: Yes     Partners: Female     Birth control/protection: Male Surgical     Comment: Getting vasectomy middle of Dec 2017   Other Topics Concern     Parent/sibling w/ CABG, MI or angioplasty before 65F 55M? No      Service Not Asked     Blood Transfusions Not Asked     Caffeine Concern No     Comment: has not had any last visit     Occupational Exposure Not Asked     Hobby Hazards Not Asked     Sleep Concern No     Stress Concern No     Weight Concern No     Special Diet Yes     Comment: low sodium     Back Care Not Asked     Exercise No     Bike Helmet Not Asked     Seat Belt Yes     Self-Exams Not Asked   Social History Narrative     Not on file     Social Determinants of Health     Financial Resource Strain: Not on file   Food Insecurity: Not on file   Transportation Needs: Not on file   Physical Activity: Not on file   Stress: Not on file   Social Connections: Not on  "file   Intimate Partner Violence: Not on file   Housing Stability: Not on file           Review of System:     Constitutional: Negative for fever or chills  Skin: Negative for rashes  Ears/Nose/Throat: Negative for nasal congestion, sore throat  Respiratory: No shortness of breath, dyspnea on exertion, cough, or hemoptysis  Cardiovascular: Negative for chest pain  Gastrointestinal: Negative for nausea, vomiting  Genitourinary: Negative for dysuria, hematuria  Musculoskeletal: Negative for myalgias  Neurologic: Negative for headaches  Psychiatric: Negative for depression, anxiety  Hematologic/Lymphatic/Immunologic: Negative  Endocrine: Negative  Behavioral: Negative for tobacco use       Physical Exam:   /88 (BP Location: Left arm, Patient Position: Sitting, Cuff Size: Adult Large)   Pulse 67   Temp 97.7  F (36.5  C) (Temporal)   Resp 16   Ht 1.81 m (5' 11.25\")   Wt 89.4 kg (197 lb)   SpO2 96%   BMI 27.28 kg/m      GENERAL: healthy, alert and no distress  EYES: eyes grossly normal to inspection, and conjunctivae and sclerae normal  HENT: Normocephalic atraumatic. Nose and mouth without ulcers or lesions  NECK: supple  RESP: lungs clear to auscultation   CV: regular rate and rhythm, normal S1 S2  LYMPH: no peripheral edema   ABDOMEN: nondistended  MS: no gross musculoskeletal defects noted  SKIN: no suspicious lesions or rashes  NEURO: Alert & Oriented x 3.   PSYCH: mentation appears normal, affect normal        Diagnostic Test Results:     {Diagnostic Test Results:920793::\"Diagnostic Test Results:\",\"none \"}    ASSESSMENT/PLAN:       {Diag Picklist:190638}        Follow Up Plan:     Patient is instructed to return to Internal Medicine clinic for follow-up visit in ***.        Jaye Martinez MD  Internal Medicine  [facility]    "

## 2022-12-01 NOTE — PROGRESS NOTES
SUBJECTIVE:   CC: Vince is an 42 year old who presents for preventative health visit.   Patient has been advised of split billing requirements and indicates understanding: Yes  HPI     Ability to successfully perform activities of daily living: Yes, no assistance needed  Home safety:  none identified   Hearing impairment: none    Today's PHQ-2 Score:   PHQ-2 ( 1999 [employer]) 11/25/2022   Q1: Little interest or pleasure in doing things 0   Q2: Feeling down, depressed or hopeless 0   PHQ-2 Score 0   PHQ-2 Total Score (12-17 Years)- Positive if 3 or more points; Administer PHQ-A if positive -         Social History     Tobacco Use     Smoking status: Never     Smokeless tobacco: Never     Tobacco comments:     socially/ocassionally    Substance Use Topics     Alcohol use: Yes     Alcohol/week: 0.0 standard drinks     Comment: occ     If you drink alcohol do you typically have >3 drinks per day or >7 drinks per week? No    Alcohol Use 5/28/2021   Prescreen: >3 drinks/day or >7 drinks/week? No   No flowsheet data found.    Last PSA: No results found for: PSA    Reviewed orders with patient. Reviewed health maintenance and updated orders accordingly - Yes  Labs reviewed in EPIC    Reviewed and updated as needed this visit by clinical staff     Meds              Reviewed and updated as needed this visit by Provider                 Past Medical History:   Diagnosis Date     Hypertension      Hypertension goal BP (blood pressure) < 140/90 12/8/2014     Palpitations      Racing heart beat      Uncomplicated asthma         Review of Systems  CONSTITUTIONAL: NEGATIVE for fever, chills, change in weight  INTEGUMENTARY/SKIN: NEGATIVE for worrisome rashes, moles or lesions  EYES: NEGATIVE for vision changes or irritation  ENT: NEGATIVE for ear, mouth and throat problems  RESP: NEGATIVE for significant cough or SOB  CV: NEGATIVE for chest pain, palpitations or peripheral edema  GI: NEGATIVE for nausea, abdominal pain, heartburn, or  "change in bowel habits   male: negative for dysuria, hematuria, decreased urinary stream, erectile dysfunction, urethral discharge  MUSCULOSKELETAL: NEGATIVE for significant arthralgias or myalgia  NEURO: NEGATIVE for weakness, dizziness or paresthesias  PSYCHIATRIC: NEGATIVE for changes in mood or affect    OBJECTIVE:   /88 (BP Location: Left arm, Patient Position: Sitting, Cuff Size: Adult Large)   Pulse 67   Temp 97.7  F (36.5  C) (Temporal)   Resp 16   Ht 1.81 m (5' 11.25\")   Wt 89.4 kg (197 lb)   SpO2 96%   BMI 27.28 kg/m      Physical Exam  GENERAL: alert and no distress  EYES: Eyes grossly normal to inspection, PERRL and conjunctivae and sclerae normal  HENT: ear canals and TM's normal, nose and mouth without ulcers or lesions  NECK: no adenopathy, no asymmetry, masses, or scars and thyroid normal to palpation  RESP: lungs clear to auscultation   CV: regular rate and rhythm  ABDOMEN: soft, nontender, no hepatosplenomegaly, no masses and bowel sounds normal  MS: no gross musculoskeletal defects noted, no edema  SKIN: no suspicious lesions or rashes  NEURO: Normal strength and tone, mentation intact and speech normal  PSYCH: mentation appears normal, affect normal/bright    Diagnostic Test Results:  Labs reviewed in Epic    ASSESSMENT/PLAN:     Diagnoses and all orders for this visit:    Routine history and physical examination of adult    Essential hypertension  -     labetalol (NORMODYNE) 100 MG tablet; Take 3 tablets (300 mg) by mouth 2 times daily        Patient has been advised of split billing requirements and indicates understanding: Yes      COUNSELING:   Reviewed preventive health counseling, as reflected in patient instructions  Special attention given to:        Regular exercise       Healthy diet/nutrition      BMI:   Estimated body mass index is 27.28 kg/m  as calculated from the following:    Height as of this encounter: 1.81 m (5' 11.25\").    Weight as of this encounter: 89.4 kg " (197 lb).   Weight management plan: Discussed healthy diet and exercise guidelines      He reports that he has never smoked. He has never used smokeless tobacco.        Jaye Martinez MD  [employer]

## 2023-01-31 ENCOUNTER — HOSPITAL ENCOUNTER (EMERGENCY)
Facility: CLINIC | Age: 43
Discharge: HOME | End: 2023-01-31
Attending: EMERGENCY MEDICINE | Admitting: EMERGENCY MEDICINE
Payer: COMMERCIAL

## 2023-01-31 ENCOUNTER — APPOINTMENT (OUTPATIENT)
Dept: CT IMAGING | Facility: CLINIC | Age: 43
End: 2023-01-31
Attending: EMERGENCY MEDICINE
Payer: COMMERCIAL

## 2023-01-31 VITALS
BODY MASS INDEX: 28 KG/M2 | HEIGHT: 71 IN | SYSTOLIC BLOOD PRESSURE: 156 MMHG | HEART RATE: 83 BPM | WEIGHT: 200 LBS | OXYGEN SATURATION: 96 % | TEMPERATURE: 98.3 F | RESPIRATION RATE: 16 BRPM | DIASTOLIC BLOOD PRESSURE: 106 MMHG

## 2023-01-31 DIAGNOSIS — K40.90 LEFT INGUINAL HERNIA: ICD-10-CM

## 2023-01-31 DIAGNOSIS — R10.31 RIGHT GROIN PAIN: ICD-10-CM

## 2023-01-31 DIAGNOSIS — R10.9 RIGHT FLANK PAIN: Primary | ICD-10-CM

## 2023-01-31 LAB
ALBUMIN SERPL BCG-MCNC: 4.5 G/DL (ref 3.5–5.2)
ALBUMIN UR-MCNC: 30 MG/DL
ALP SERPL-CCNC: 102 U/L (ref 40–129)
ALT SERPL W P-5'-P-CCNC: 32 U/L (ref 10–50)
ANION GAP SERPL CALCULATED.3IONS-SCNC: 15 MMOL/L (ref 7–15)
APPEARANCE UR: CLEAR
AST SERPL W P-5'-P-CCNC: 29 U/L (ref 10–50)
BACTERIA #/AREA URNS HPF: ABNORMAL /HPF
BASOPHILS # BLD AUTO: 0.1 10E3/UL (ref 0–0.2)
BASOPHILS NFR BLD AUTO: 1 %
BILIRUB SERPL-MCNC: 0.8 MG/DL
BILIRUB UR QL STRIP: NEGATIVE
BUN SERPL-MCNC: 22.7 MG/DL (ref 6–20)
CALCIUM SERPL-MCNC: 9.7 MG/DL (ref 8.6–10)
CHLORIDE SERPL-SCNC: 95 MMOL/L (ref 98–107)
COLOR UR AUTO: YELLOW
CREAT SERPL-MCNC: 1.24 MG/DL (ref 0.67–1.17)
DEPRECATED HCO3 PLAS-SCNC: 28 MMOL/L (ref 22–29)
EOSINOPHIL # BLD AUTO: 0 10E3/UL (ref 0–0.7)
EOSINOPHIL NFR BLD AUTO: 0 %
ERYTHROCYTE [DISTWIDTH] IN BLOOD BY AUTOMATED COUNT: 12.3 % (ref 10–15)
GFR SERPL CREATININE-BSD FRML MDRD: 74 ML/MIN/1.73M2
GLUCOSE SERPL-MCNC: 111 MG/DL (ref 70–99)
GLUCOSE UR STRIP-MCNC: NEGATIVE MG/DL
HCT VFR BLD AUTO: 42.2 % (ref 40–53)
HGB BLD-MCNC: 15.5 G/DL (ref 13.3–17.7)
HGB UR QL STRIP: NEGATIVE
HYALINE CASTS: 5 /LPF
IMM GRANULOCYTES # BLD: 0 10E3/UL
IMM GRANULOCYTES NFR BLD: 1 %
KETONES UR STRIP-MCNC: NEGATIVE MG/DL
LEUKOCYTE ESTERASE UR QL STRIP: NEGATIVE
LIPASE SERPL-CCNC: 35 U/L (ref 13–60)
LYMPHOCYTES # BLD AUTO: 1.4 10E3/UL (ref 0.8–5.3)
LYMPHOCYTES NFR BLD AUTO: 25 %
MCH RBC QN AUTO: 32.4 PG (ref 26.5–33)
MCHC RBC AUTO-ENTMCNC: 36.7 G/DL (ref 31.5–36.5)
MCV RBC AUTO: 88 FL (ref 78–100)
MONOCYTES # BLD AUTO: 0.6 10E3/UL (ref 0–1.3)
MONOCYTES NFR BLD AUTO: 10 %
MUCOUS THREADS #/AREA URNS LPF: PRESENT /LPF
NEUTROPHILS # BLD AUTO: 3.4 10E3/UL (ref 1.6–8.3)
NEUTROPHILS NFR BLD AUTO: 63 %
NITRATE UR QL: NEGATIVE
NRBC # BLD AUTO: 0 10E3/UL
NRBC BLD AUTO-RTO: 0 /100
PH UR STRIP: 6.5 [PH] (ref 5–7)
PLATELET # BLD AUTO: 331 10E3/UL (ref 150–450)
POTASSIUM SERPL-SCNC: 3.2 MMOL/L (ref 3.4–5.3)
PROT SERPL-MCNC: 7.3 G/DL (ref 6.4–8.3)
RBC # BLD AUTO: 4.79 10E6/UL (ref 4.4–5.9)
RBC URINE: 1 /HPF
SODIUM SERPL-SCNC: 138 MMOL/L (ref 136–145)
SP GR UR STRIP: 1.03 (ref 1–1.03)
SQUAMOUS EPITHELIAL: <1 /HPF
UROBILINOGEN UR STRIP-MCNC: NORMAL MG/DL
WBC # BLD AUTO: 5.5 10E3/UL (ref 4–11)
WBC URINE: 1 /HPF

## 2023-01-31 PROCEDURE — 74176 CT ABD & PELVIS W/O CONTRAST: CPT

## 2023-01-31 PROCEDURE — 83690 ASSAY OF LIPASE: CPT | Performed by: EMERGENCY MEDICINE

## 2023-01-31 PROCEDURE — 85025 COMPLETE CBC W/AUTO DIFF WBC: CPT | Performed by: EMERGENCY MEDICINE

## 2023-01-31 PROCEDURE — 99284 EMERGENCY DEPT VISIT MOD MDM: CPT | Mod: 25

## 2023-01-31 PROCEDURE — 36415 COLL VENOUS BLD VENIPUNCTURE: CPT | Performed by: EMERGENCY MEDICINE

## 2023-01-31 PROCEDURE — 81001 URINALYSIS AUTO W/SCOPE: CPT | Performed by: EMERGENCY MEDICINE

## 2023-01-31 PROCEDURE — 80053 COMPREHEN METABOLIC PANEL: CPT | Performed by: EMERGENCY MEDICINE

## 2023-01-31 ASSESSMENT — ENCOUNTER SYMPTOMS
FEVER: 0
BACK PAIN: 0
FLANK PAIN: 1
DIARRHEA: 0
ABDOMINAL PAIN: 0
VOMITING: 0
DYSURIA: 0
SHORTNESS OF BREATH: 0
NAUSEA: 0
WEAKNESS: 0

## 2023-01-31 ASSESSMENT — ACTIVITIES OF DAILY LIVING (ADL): ADLS_ACUITY_SCORE: 35

## 2023-01-31 NOTE — ED PROVIDER NOTES
History     Chief Complaint:  Flank Pain     HPI   Vince Hatch is a 42 year old male with a history of kidney stones with prior lithotripsy and ureteral stent placement in [place] several years ago who presents with right flank pain since Friday last week.  Patient notes that he has had pain symptoms very similar to prior kidney stones but given the persistence of the pain he presents this morning.  He notes the pain has traveled somewhat more towards the right lower groin region and some referred pain to the right testicle however no direct pain to the testicle.  There is no pain with urination.  No blood noted in the urine that he can identify.  He denies any diarrhea or vomiting.  Denies fevers.  Denies chest pain or shortness of breath.  Notes that he has had a prior appendectomy as well as vasectomy.  Beyond the right lower groin tenderness he has no anterior abdominal discomfort.  He has not taken any pain medications this morning and is declining pain medications at this time.    Review of External Notes: Reviewed primary care note from 1/29/2018.  History of hypertension.    ROS:  Review of Systems   Constitutional: Negative for fever.   Respiratory: Negative for shortness of breath.    Cardiovascular: Negative for chest pain.   Gastrointestinal: Negative for abdominal pain, diarrhea, nausea and vomiting.   Genitourinary: Positive for flank pain. Negative for dysuria, penile pain and testicular pain.   Musculoskeletal: Negative for back pain.   Neurological: Negative for weakness.   All other systems reviewed and are negative.    Allergies:  Lisinopril     Medications:    amLODIPine (NORVASC) 10 MG tablet  cetirizine (ZYRTEC) 10 MG tablet  chlorthalidone (HYGROTON) 25 MG tablet  labetalol (NORMODYNE) 100 MG tablet        Past Medical History:    Past Medical History:   Diagnosis Date     Hypertension      Hypertension goal BP (blood pressure) < 140/90 12/8/2014     Palpitations      Racing heart beat   "    Uncomplicated asthma        Past Surgical History:    Past Surgical History:   Procedure Laterality Date     APPENDECTOMY  2000     ARTHROSCOPY KNEE Right 10/27/2014    Procedure: ARTHROSCOPY KNEE;  Surgeon: Germain Sanchez MD;  Location: [facility] OR     ARTHROSCOPY KNEE WITH MEDIAL MENISCECTOMY Right 10/27/2014    Procedure: ARTHROSCOPY KNEE WITH MEDIAL MENISCECTOMY;  Surgeon: Germain Sanchez MD;  Location: [facility] OR     COLONOSCOPY      [facility] in 2013     ENT SURGERY       GENITOURINARY SURGERY  2015    Kidney stone removal and stent        Family History:    family history includes Aneurysm (age of onset: 70) in his paternal grandfather; Cancer in his maternal grandfather; Heart Failure in his maternal grandmother; Hyperlipidemia in his father; Hypertension in his father and maternal grandmother.    Social History:   reports that he has never smoked. He has never used smokeless tobacco. He reports current alcohol use. He reports that he does not use drugs.  PCP: Jaye Martinez     Physical Exam     Patient Vitals for the past 24 hrs:   BP Temp Temp src Pulse Resp SpO2 Height Weight   01/31/23 0745 (!) 156/106 98.3  F (36.8  C) Oral 83 16 96 % 1.803 m (5' 11\") 90.7 kg (200 lb)        Physical Exam  General: Alert, appears well-developed and well-nourished. Cooperative.     In mild distress  HEENT:  Head:  Atraumatic  Ears:  External ears are normal  Mouth/Throat:  Oropharynx is without erythema or exudate and mucous membranes are moist.   Eyes:   Conjunctivae normal and EOM are normal. No scleral icterus.  CV:  Normal rate, regular rhythm, normal heart sounds and radial pulses are 2+ and symmetric.  No murmur.  Resp:  Breath sounds are clear bilaterally    Non-labored, no retractions or accessory muscle use  GI:  Abdomen is soft, no distension, no tenderness. No rebound or guarding.  Right CVA tenderness, faint.   :  Age-appropriate genitalia.  There is no testicular tenderness or swelling or masses noted " to the right or left testicles.  No penile discharge.  MS:  Normal range of motion. No edema.    Normal strength in all 4 extremities.     Back atraumatic.    No midline cervical, thoracic, or lumbar tenderness  Skin:  Warm and dry.  No rash or lesions noted.  Neuro: Alert. Normal strength.  GCS: 15  Psych:  Normal mood and affect.    Emergency Department Course     Imaging:  Abd/pelvis CT no contrast - Stone Protocol   Final Result   IMPRESSION:    1.  No acute findings in the abdomen and pelvis.   2.  A 2 mm nonobstructing calculus in the right kidney and 2 mm   nonobstructing calculus in the left kidney. No hydronephrosis,   perinephric stranding or urinary bladder calculi.   3.  Small fat-containing abdominal hernia.      SONIA VALENCIA MD            SYSTEM ID:  [id number]         Report per radiology    Laboratory:  Labs Ordered and Resulted from Time of ED Arrival to Time of ED Departure   COMPREHENSIVE METABOLIC PANEL - Abnormal       Result Value    Sodium 138      Potassium 3.2 (*)     Chloride 95 (*)     Carbon Dioxide (CO2) 28      Anion Gap 15      Urea Nitrogen 22.7 (*)     Creatinine 1.24 (*)     Calcium 9.7      Glucose 111 (*)     Alkaline Phosphatase 102      AST 29      ALT 32      Protein Total 7.3      Albumin 4.5      Bilirubin Total 0.8      GFR Estimate 74     ROUTINE UA WITH MICROSCOPIC REFLEX TO CULTURE - Abnormal    Color Urine Yellow      Appearance Urine Clear      Glucose Urine Negative      Bilirubin Urine Negative      Ketones Urine Negative      Specific Gravity Urine 1.027      Blood Urine Negative      pH Urine 6.5      Protein Albumin Urine 30 (*)     Urobilinogen Urine Normal      Nitrite Urine Negative      Leukocyte Esterase Urine Negative      Bacteria Urine Few (*)     Mucus Urine Present (*)     RBC Urine 1      WBC Urine 1      Squamous Epithelials Urine <1      Hyaline Casts Urine 5 (*)    CBC WITH PLATELETS AND DIFFERENTIAL - Abnormal    WBC Count 5.5      RBC Count 4.79       Hemoglobin 15.5      Hematocrit 42.2      MCV 88      MCH 32.4      MCHC 36.7 (*)     RDW 12.3      Platelet Count 331      % Neutrophils 63      % Lymphocytes 25      % Monocytes 10      % Eosinophils 0      % Basophils 1      % Immature Granulocytes 1      NRBCs per 100 WBC 0      Absolute Neutrophils 3.4      Absolute Lymphocytes 1.4      Absolute Monocytes 0.6      Absolute Eosinophils 0.0      Absolute Basophils 0.1      Absolute Immature Granulocytes 0.0      Absolute NRBCs 0.0     LIPASE - Normal    Lipase 35          Procedures     Emergency Department Course & Assessments:     Interventions:  Medications - No data to display     Independent Interpretation (X-rays, CTs, rhythm strip):  I independently reviewed CT imaging which showed no evidence of active ureteral stone or hydronephrosis.  Agree with radiology interpretation.    Disposition:  The patient was discharged to home.     Impression & Plan      Medical Decision Making:  Vince Hatch is a 42 year old male who presents with right flank pain.  Associated symptoms include faint right groin pain.   A broad differential diagnosis was considered including diverticulitis, ureterolithiasis, tumor, colitis, cholecystitis, aneurysm, dissection, hydronephrosis, pneumonia, rib fracture, UTI, pyelonephritis amongst many other etiologies.  I doubt PE given lack of CP, SOB and stable vital signs.    The workup in the ED is at this point negative.  No etiology for the patients pain is found at this point and my suspicion of an intraabdominal or intrathoracic catastrophe or other worrisome etiology is very low.  I will not therefore admit for serial exams and further workup.  Patient may have had a recently passed stone and so could be having some ureteral spasms but thankfully no sinister active intra-abdominal findings.  There was a noted left inguinal fat-containing hernia that was communicated to the patient.  Patient is hemodynamically stable in ED. Plan is  home with flank pain recheck by primary care physician or return to ED at that time.  Return for fevers greater than 102, increasing pain, other new symptoms develop.  Flank pain handout given.  Questions were answered.   Diagnosis:    ICD-10-CM    1. Right flank pain  R10.9       2. Left inguinal hernia  K40.90       3. Right groin pain  R10.31            Discharge Medications:  New Prescriptions    No medications on file        1/31/2023   Quan Rodriguez MD White, Scott, MD  01/31/23 0905

## 2023-01-31 NOTE — ED TRIAGE NOTES
Patient states groin pain, flank pain for 4 days.  Comes on in waves.  Hx of kidney stones.  Thinks he has passed some stones.       Triage Assessment       Row Name 01/31/23 0716       Triage Assessment (Adult)    Airway WDL WDL       Respiratory WDL    Respiratory WDL WDL       Skin Circulation/Temperature WDL    Skin Circulation/Temperature WDL WDL       Cardiac WDL    Cardiac WDL WDL       Peripheral/Neurovascular WDL    Peripheral Neurovascular WDL WDL       Cognitive/Neuro/Behavioral WDL    Cognitive/Neuro/Behavioral WDL WDL

## 2023-04-17 ENCOUNTER — OFFICE VISIT (OUTPATIENT)
Dept: FAMILY MEDICINE | Facility: CLINIC | Age: 43
End: 2023-04-17
Payer: COMMERCIAL

## 2023-04-17 VITALS
SYSTOLIC BLOOD PRESSURE: 148 MMHG | OXYGEN SATURATION: 97 % | TEMPERATURE: 98 F | DIASTOLIC BLOOD PRESSURE: 98 MMHG | HEART RATE: 90 BPM

## 2023-04-17 DIAGNOSIS — J20.8 ACUTE BACTERIAL BRONCHITIS: Primary | ICD-10-CM

## 2023-04-17 DIAGNOSIS — B96.89 ACUTE BACTERIAL BRONCHITIS: Primary | ICD-10-CM

## 2023-04-17 DIAGNOSIS — R06.2 WHEEZING: ICD-10-CM

## 2023-04-17 PROCEDURE — 99213 OFFICE O/P EST LOW 20 MIN: CPT

## 2023-04-17 RX ORDER — AZITHROMYCIN 250 MG/1
TABLET, FILM COATED ORAL
Qty: 6 TABLET | Refills: 0 | Status: SHIPPED | OUTPATIENT
Start: 2023-04-17 | End: 2023-04-22

## 2023-04-17 RX ORDER — ALBUTEROL SULFATE 90 UG/1
2 AEROSOL, METERED RESPIRATORY (INHALATION) EVERY 6 HOURS PRN
Qty: 18 G | Refills: 0 | Status: SHIPPED | OUTPATIENT
Start: 2023-04-17 | End: 2023-10-20

## 2023-04-17 RX ORDER — PREDNISONE 20 MG/1
20 TABLET ORAL 2 TIMES DAILY
Qty: 10 TABLET | Refills: 0 | Status: SHIPPED | OUTPATIENT
Start: 2023-04-17 | End: 2023-04-22

## 2023-04-17 ASSESSMENT — ENCOUNTER SYMPTOMS
WHEEZING: 1
SWEATS: 0
FATIGUE: 1
COUGH: 1
SHORTNESS OF BREATH: 0

## 2023-04-17 ASSESSMENT — LIFESTYLE VARIABLES: SMOKING_STATUS: 0

## 2023-04-17 NOTE — PROGRESS NOTES
Assessment & Plan       ICD-10-CM    1. Acute bacterial bronchitis  J20.8 azithromycin (ZITHROMAX) 250 MG tablet    B96.89 predniSONE (DELTASONE) 20 MG tablet     albuterol (PROAIR HFA/PROVENTIL HFA/VENTOLIN HFA) 108 (90 Base) MCG/ACT inhaler      2. Wheezing  R06.2            Patient instructions:    Based on duration, symptoms and exam this appears to be bacterial in nature. Will treat with antibiotic, steroid and inhaler.   Increase fluids with water, Pedialyte, Gatorade, or rehydrating beverages. Alternate Tylenol and Ibuprofen as needed for aches, pains or fever. Follow clear liquid to BRAT diet (bananas, rice, applesauce, toast) as needed for any upset stomach. Rest as much as possible. Use OTC cough and cold medication, I recommend Mucinex during the day, Robitussin at night. Run humidifier at night. May try to help immune system with Vitamin C and Zinc. Follow up in clinic if symptoms persist or worsen. This usually can last 10-14 days.     Return if symptoms worsen or fail to improve, for Follow up.    At the end of the encounter, I discussed results, diagnosis, medications. Discussed red flags for immediate return to clinic/ER, as well as indications for follow up if no improvement. Patient understood and agreed to plan. Patient was stable for discharge.    Pat Clarke is a 42 year old male who presents to clinic today for the following health issues:  Chief Complaint   Patient presents with     Urgent Care     Cough     A week started on of with a sore throat and later has been having a cough. Pt states started feeling fatigue, coughing up phlegm, slight wheezing. No hx of asthma. OTC tx- cough syrup.      Cough  The current episode started more than 1 week ago. The problem occurs constantly. The problem has been gradually improving. The cough is productive of sputum. There has been no fever. Associated symptoms include ear congestion and wheezing. Pertinent negatives include no sweats and no  shortness of breath. He has tried decongestants for the symptoms. The treatment provided mild relief. He is not a smoker.           Review of Systems   Constitutional: Positive for fatigue.   Respiratory: Positive for cough and wheezing. Negative for shortness of breath.        Problem List:  2021-03: Uncomplicated asthma, unspecified asthma severity,   unspecified whether persistent  2021-03: Overweight  2021-03: Family history of essential hypertension  2014-12: Hypertension goal BP (blood pressure) < 140/90  2010-10: CARDIOVASCULAR SCREENING; LDL GOAL LESS THAN 160  2007-02: Lumbosacral ligament sprain  2007-02: Nonallopathic lesion of sacral region  2006-11: Allergic state  Hypertension  Racing heart beat  Palpitations      Past Medical History:   Diagnosis Date     Hypertension      Hypertension goal BP (blood pressure) < 140/90 12/8/2014     Palpitations      Racing heart beat      Uncomplicated asthma        Social History     Tobacco Use     Smoking status: Never     Smokeless tobacco: Never     Tobacco comments:     socially/ocassionally    Vaping Use     Vaping status: Not on file   Substance Use Topics     Alcohol use: Yes     Alcohol/week: 0.0 standard drinks of alcohol     Comment: occ           Objective    BP (!) 160/111   Pulse 90   Temp 98  F (36.7  C) (Tympanic)   SpO2 97%   Physical Exam  Constitutional:       Appearance: Normal appearance.   HENT:      Head: Normocephalic and atraumatic.   Cardiovascular:      Rate and Rhythm: Normal rate and regular rhythm.      Heart sounds: Normal heart sounds.   Pulmonary:      Effort: Pulmonary effort is normal.      Breath sounds: Normal breath sounds. Decreased air movement present.   Musculoskeletal:         General: Normal range of motion.      Cervical back: Normal range of motion and neck supple.   Skin:     General: Skin is warm and dry.   Neurological:      General: No focal deficit present.      Mental Status: He is alert and oriented to person,  place, and time.   Psychiatric:         Mood and Affect: Mood normal.         Behavior: Behavior normal.         Thought Content: Thought content normal.         Judgment: Judgment normal.              Jr Martinez PA-C

## 2023-05-05 ENCOUNTER — OFFICE VISIT (OUTPATIENT)
Dept: FAMILY MEDICINE | Facility: CLINIC | Age: 43
End: 2023-05-05
Payer: COMMERCIAL

## 2023-05-05 VITALS
TEMPERATURE: 97.7 F | HEART RATE: 87 BPM | SYSTOLIC BLOOD PRESSURE: 148 MMHG | DIASTOLIC BLOOD PRESSURE: 82 MMHG | RESPIRATION RATE: 16 BRPM | OXYGEN SATURATION: 98 %

## 2023-05-05 DIAGNOSIS — J01.00 ACUTE NON-RECURRENT MAXILLARY SINUSITIS: Primary | ICD-10-CM

## 2023-05-05 PROCEDURE — 99213 OFFICE O/P EST LOW 20 MIN: CPT

## 2023-05-05 RX ORDER — DOXYCYCLINE 100 MG/1
100 CAPSULE ORAL 2 TIMES DAILY
Qty: 14 CAPSULE | Refills: 0 | Status: SHIPPED | OUTPATIENT
Start: 2023-05-05 | End: 2023-05-12

## 2023-05-05 NOTE — PROGRESS NOTES
Assessment & Plan     Acute non-recurrent maxillary sinusitis  Likely azithromycin did not help with sinuses.  We will treat with doxycycline for 7 days.  Lungs are completely clear discussed chest x-ray but declined today    - doxycycline hyclate (VIBRAMYCIN) 100 MG capsule; Take 1 capsule (100 mg) by mouth 2 times daily for 7 days      15 minutes spent by me on the date of the encounter doing chart review, patient visit and documentation        See Patient Instructions    Return in about 1 week (around 5/12/2023), or if symptoms worsen or fail to improve.    [facility]    Pat Clarke is a 43 year old, presenting for the following health issues:  Cough (Pt was seen two weeks ago for a cough and then got better with medications but Wednesday (last week) he started feeling sick again with the cough)         View : No data to display.              MARIAMA Donahue presents to clinic today after being seen April 17, 2023 for bronchitis that was treated with azithromycin, prednisone and albuterol inhaler.  He does have a history of asthma that is typically well under control.  States his symptoms got better and then came back.  This time his symptoms are more in his sinuses.  But is still coughing quite frequently.  Cough is productive of clear phlegm and sometimes yellow.  Denies any chest pain or shortness of breath, however states when he does walk around the block with his daughter does not have the energy he normally has but he feels he is getting better.  Has some sinus pressure over the left maxillary sinus and postnasal drainage      Review of Systems   Constitutional, HEENT, cardiovascular, pulmonary, gi and gu systems are negative, except as otherwise noted.      Objective    BP (!) 148/82   Pulse 87   Temp 97.7  F (36.5  C) (Tympanic)   Resp 16   SpO2 98%   There is no height or weight on file to calculate  BMI.  Physical Exam   GENERAL: healthy, alert and no distress  EYES: Eyes grossly normal to inspection, PERRL and conjunctivae and sclerae normal  HENT: normal cephalic/atraumatic, ear canals and TM's normal, nasal mucosa edematous , oropharynx clear, oral mucous membranes moist and sinuses: maxillary tenderness on left  NECK: no adenopathy  RESP: lungs clear to auscultation - no rales, rhonchi or wheezes  CV: regular rates and rhythm, normal S1 S2, no S3 or S4 and no murmur, click or rub

## 2023-05-05 NOTE — PATIENT INSTRUCTIONS
Add Mucinex to your over the counter medications for congestion  Lungs are clear and I believe your bronchitis is resolved although you can continue to cough up to 4 weeks.   Take the Doxy with a meal and do not lay down for at least an hour after taking it.

## 2023-07-26 ENCOUNTER — OFFICE VISIT (OUTPATIENT)
Dept: ORTHOPEDICS | Facility: CLINIC | Age: 43
End: 2023-07-26
Payer: COMMERCIAL

## 2023-07-26 ENCOUNTER — ANCILLARY PROCEDURE (OUTPATIENT)
Dept: GENERAL RADIOLOGY | Facility: CLINIC | Age: 43
End: 2023-07-26
Attending: FAMILY MEDICINE
Payer: COMMERCIAL

## 2023-07-26 VITALS
WEIGHT: 200 LBS | BODY MASS INDEX: 28 KG/M2 | DIASTOLIC BLOOD PRESSURE: 86 MMHG | HEIGHT: 71 IN | SYSTOLIC BLOOD PRESSURE: 128 MMHG

## 2023-07-26 DIAGNOSIS — M25.562 ACUTE PAIN OF LEFT KNEE: Primary | ICD-10-CM

## 2023-07-26 DIAGNOSIS — M25.562 LEFT KNEE PAIN: ICD-10-CM

## 2023-07-26 PROCEDURE — 99203 OFFICE O/P NEW LOW 30 MIN: CPT | Mod: 25 | Performed by: FAMILY MEDICINE

## 2023-07-26 PROCEDURE — 20611 DRAIN/INJ JOINT/BURSA W/US: CPT | Mod: LT | Performed by: FAMILY MEDICINE

## 2023-07-26 PROCEDURE — 73562 X-RAY EXAM OF KNEE 3: CPT | Mod: TC | Performed by: FAMILY MEDICINE

## 2023-07-26 RX ORDER — LIDOCAINE HYDROCHLORIDE 10 MG/ML
5 INJECTION, SOLUTION INFILTRATION; PERINEURAL
Status: SHIPPED | OUTPATIENT
Start: 2023-07-26

## 2023-07-26 RX ORDER — METHYLPREDNISOLONE ACETATE 40 MG/ML
40 INJECTION, SUSPENSION INTRA-ARTICULAR; INTRALESIONAL; INTRAMUSCULAR; SOFT TISSUE
Status: SHIPPED | OUTPATIENT
Start: 2023-07-26

## 2023-07-26 RX ORDER — ROPIVACAINE HYDROCHLORIDE 5 MG/ML
4 INJECTION, SOLUTION EPIDURAL; INFILTRATION; PERINEURAL
Status: SHIPPED | OUTPATIENT
Start: 2023-07-26

## 2023-07-26 RX ADMIN — METHYLPREDNISOLONE ACETATE 40 MG: 40 INJECTION, SUSPENSION INTRA-ARTICULAR; INTRALESIONAL; INTRAMUSCULAR; SOFT TISSUE at 10:18

## 2023-07-26 RX ADMIN — LIDOCAINE HYDROCHLORIDE 5 ML: 10 INJECTION, SOLUTION INFILTRATION; PERINEURAL at 10:18

## 2023-07-26 RX ADMIN — ROPIVACAINE HYDROCHLORIDE 4 ML: 5 INJECTION, SOLUTION EPIDURAL; INFILTRATION; PERINEURAL at 10:18

## 2023-07-26 ASSESSMENT — PAIN SCALES - GENERAL: PAINLEVEL: SEVERE PAIN (6)

## 2023-07-26 NOTE — PATIENT INSTRUCTIONS
1. Acute pain of left knee      -Patient has acute left knee pain and swelling likely due to aggravation of a chronic degenerative medial meniscus tear  -X-rays taken office today show no acute fracture, dislocation or significant osseous abnormalities.  -Patient tolerated left knee intra-articular aspiration and cortisone injection today without complications.  Patient was given postprocedure instructions  -Patient may continue with medial  hinged knee brace as needed-patient will continue with physical therapy and home exercise program  -We will call us in 6 weeks if pain and dysfunction do not improve for potential MRI for further evaluation  -Call direct clinic number [[phone]] at any time with questions or concerns.    Albert Yeo MD [facility]  [facility]

## 2023-07-26 NOTE — PROGRESS NOTES
ASSESSMENT & PLAN  Patient Instructions     1. Acute pain of left knee      -Patient has acute left knee pain and swelling likely due to aggravation of a chronic degenerative medial meniscus tear  -X-rays taken office today show no acute fracture, dislocation or significant osseous abnormalities.  -Patient tolerated left knee intra-articular aspiration and cortisone injection today without complications.  Patient was given postprocedure instructions  -Patient may continue with medial  hinged knee brace as needed-patient will continue with physical therapy and home exercise program  -We will call us in 6 weeks if pain and dysfunction do not improve for potential MRI for further evaluation  -Call direct clinic number [[phone]] at any time with questions or concerns.    Albert Yeo MD [facility]  [facility]        -----    SUBJECTIVE  Vince Hatch is a/an 43 year old male who is seen as a self referral for evaluation of left knee pain. The patient is seen by themselves.    Onset: 6 month(s) ago. Patient describes injury as He was sitting cross-legged, came uncrossed to stand up, felt a pop and lock in his knee,   Location of Pain: left knee, medial femoral condyle, medial margin of Left patellar tendon.  Rating of Pain at worst: 7/10  Rating of Pain Currently: 6/10  Worsened by: walking, stairs, kneeling  Better with: advil only if needed, icing, wearing   Treatments tried: rest/activity avoidance, ice, ibuprofen, home exercises, and casting/splinting/bracing  Associated symptoms: no distal numbness or tingling; denies swelling or warmth, swelling, locking or catching, and feeling of instability. Bouts of sharp pain, mostly a dull ache.   Orthopedic history: NO. Right knee meniscus tear 12 years ago  Relevant surgical history: NO  Social history: social history: works at [employer]     Past Medical History:   Diagnosis Date     "Hypertension     Hypertension goal BP (blood pressure) < 140/90 12/8/2014    Palpitations     Racing heart beat     Uncomplicated asthma      Social History     Socioeconomic History    Marital status:    Tobacco Use    Smoking status: Never    Smokeless tobacco: Never    Tobacco comments:     socially/ocassionally    Substance and Sexual Activity    Alcohol use: Yes     Alcohol/week: 0.0 standard drinks of alcohol     Comment: occ    Drug use: No    Sexual activity: Yes     Partners: Female     Birth control/protection: Male Surgical     Comment: Getting vasectomy middle of Dec 2017   Other Topics Concern    Parent/sibling w/ CABG, MI or angioplasty before 65F 55M? No    Caffeine Concern No     Comment: has not had any last visit    Sleep Concern No    Stress Concern No    Weight Concern No    Special Diet Yes     Comment: low sodium    Exercise No    Seat Belt Yes         Patient's past medical, surgical, social, and family histories were reviewed today and no changes are noted.    REVIEW OF SYSTEMS:  10 point ROS is negative other than symptoms noted above in HPI, Past Medical History or as stated below  Constitutional: NEGATIVE for fever, chills, change in weight  Skin: NEGATIVE for worrisome rashes, moles or lesions  GI/: NEGATIVE for bowel or bladder changes  Neuro: NEGATIVE for weakness, dizziness or paresthesias    OBJECTIVE:  /86   Ht 1.803 m (5' 11\")   Wt 90.7 kg (200 lb)   BMI 27.89 kg/m     General: healthy, alert and in no distress  HEENT: no scleral icterus or conjunctival erythema  Skin: no suspicious lesions or rash. No jaundice.  CV: no pedal edema  Resp: normal respiratory effort without conversational dyspnea   Psych: normal mood and affect  Gait: normal steady gait with appropriate coordination and balance  Neuro: Normal light sensory exam of lower extremity  MSK:  LEFT KNEE  Inspection:    normal alignment  Palpation:    Tender about the medial joint line. Remainder of bony and " ligamentous landmarks are nontender.    Moderate effusion is present    Patellofemoral crepitus is Absent  Range of Motion:     00 extension to 1250 flexion  Strength:    Quadriceps grossly intact    Extensor mechanism intact  Special Tests:    Positive: Dara's    Negative: Patellar grind, patellar apprehension, MCL/valgus stress (0 & 30 deg), LCL/varus stress (0 & 30 deg), Lachman's, anterior drawer, posterior drawer    Independent visualization of the below image:  Recent Results (from the past 24 hour(s))   XR Knee Standing AP Bilat [place] Bilat Lat Left    Narrative    Acute fracture, dislocation or osseous abnormalities.             Large Joint Injection/Arthocentesis: L knee joint    Date/Time: 7/26/2023 10:18 AM    Performed by: Yeo, Albert, MD  Authorized by: Yeo, Albert, MD    Indications:  Pain and osteoarthritis  Needle Size:  22 G  Guidance: ultrasound    Approach:  Anterolateral  Location:  Knee      Medications:  40 mg methylPREDNISolone 40 MG/ML; 4 mL ropivacaine 5 MG/ML; 5 mL lidocaine 1 %  Aspirate amount (mL):  17  Aspirate:  Yellow and clear  Outcome:  Tolerated well, no immediate complications  Procedure discussed: discussed risks, benefits, and alternatives    Consent Given by:  Patient  Timeout: timeout called immediately prior to procedure    Prep: patient was prepped and draped in usual sterile fashion     Ultrasound was used to ensure safe and accurate needle placement and injection. Ultrasound images of the procedure were permanently stored.        Albert Yeo MD [facility]

## 2023-07-26 NOTE — LETTER
7/26/2023         RE: Vince Hatch  [address]  [facility] [address]        Dear Colleague,    Thank you for referring your patient, Vince Hatch, to the [facility] [place]. Please see a copy of my visit note below.    ASSESSMENT & PLAN  Patient Instructions     1. Acute pain of left knee      -Patient has acute left knee pain and swelling likely due to aggravation of a chronic degenerative medial meniscus tear  -X-rays taken office today show no acute fracture, dislocation or significant osseous abnormalities.  -Patient tolerated left knee intra-articular aspiration and cortisone injection today without complications.  Patient was given postprocedure instructions  -Patient may continue with medial  hinged knee brace as needed-patient will continue with physical therapy and home exercise program  -We will call us in 6 weeks if pain and dysfunction do not improve for potential MRI for further evaluation  -Call direct clinic number [[phone]] at any time with questions or concerns.    Albert Yeo MD [facility]  [facility] [place]        -----    SUBJECTIVE  Vince Hatch is a/an 43 year old male who is seen as a self referral for evaluation of left knee pain. The patient is seen by themselves.    Onset: 6 month(s) ago. Patient describes injury as He was sitting cross-legged, came uncrossed to stand up, felt a pop and lock in his knee,   Location of Pain: left knee, medial femoral condyle, medial margin of Left patellar tendon.  Rating of Pain at worst: 7/10  Rating of Pain Currently: 6/10  Worsened by: walking, stairs, kneeling  Better with: advil only if needed, icing, wearing   Treatments tried: rest/activity avoidance, ice, ibuprofen, home exercises, and casting/splinting/bracing  Associated symptoms: no distal numbness or tingling; denies swelling or warmth, swelling, locking or catching, and  "feeling of instability. Bouts of sharp pain, mostly a dull ache.   Orthopedic history: NO. Right knee meniscus tear 12 years ago  Relevant surgical history: NO  Social history: social history: works at [employer]     Past Medical History:   Diagnosis Date     Hypertension      Hypertension goal BP (blood pressure) < 140/90 12/8/2014     Palpitations      Racing heart beat      Uncomplicated asthma      Social History     Socioeconomic History     Marital status:    Tobacco Use     Smoking status: Never     Smokeless tobacco: Never     Tobacco comments:     socially/ocassionally    Substance and Sexual Activity     Alcohol use: Yes     Alcohol/week: 0.0 standard drinks of alcohol     Comment: occ     Drug use: No     Sexual activity: Yes     Partners: Female     Birth control/protection: Male Surgical     Comment: Getting vasectomy middle of Dec 2017   Other Topics Concern     Parent/sibling w/ CABG, MI or angioplasty before 65F 55M? No     Caffeine Concern No     Comment: has not had any last visit     Sleep Concern No     Stress Concern No     Weight Concern No     Special Diet Yes     Comment: low sodium     Exercise No     Seat Belt Yes         Patient's past medical, surgical, social, and family histories were reviewed today and no changes are noted.    REVIEW OF SYSTEMS:  10 point ROS is negative other than symptoms noted above in HPI, Past Medical History or as stated below  Constitutional: NEGATIVE for fever, chills, change in weight  Skin: NEGATIVE for worrisome rashes, moles or lesions  GI/: NEGATIVE for bowel or bladder changes  Neuro: NEGATIVE for weakness, dizziness or paresthesias    OBJECTIVE:  /86   Ht 1.803 m (5' 11\")   Wt 90.7 kg (200 lb)   BMI 27.89 kg/m     General: healthy, alert and in no distress  HEENT: no scleral icterus or conjunctival erythema  Skin: no suspicious lesions or rash. No jaundice.  CV: no pedal edema  Resp: normal respiratory effort without " conversational dyspnea   Psych: normal mood and affect  Gait: normal steady gait with appropriate coordination and balance  Neuro: Normal light sensory exam of lower extremity  MSK:  LEFT KNEE  Inspection:    normal alignment  Palpation:    Tender about the medial joint line. Remainder of bony and ligamentous landmarks are nontender.    Moderate effusion is present    Patellofemoral crepitus is Absent  Range of Motion:     00 extension to 1250 flexion  Strength:    Quadriceps grossly intact    Extensor mechanism intact  Special Tests:    Positive: Dara's    Negative: Patellar grind, patellar apprehension, MCL/valgus stress (0 & 30 deg), LCL/varus stress (0 & 30 deg), Lachman's, anterior drawer, posterior drawer    Independent visualization of the below image:  Recent Results (from the past 24 hour(s))   XR Knee Standing AP Bilat [place] Bilat Lat Left    Narrative    Acute fracture, dislocation or osseous abnormalities.             Large Joint Injection/Arthocentesis: L knee joint    Date/Time: 7/26/2023 10:18 AM    Performed by: Yeo, Albert, MD  Authorized by: Yeo, Albert, MD    Indications:  Pain and osteoarthritis  Needle Size:  22 G  Guidance: ultrasound    Approach:  Anterolateral  Location:  Knee      Medications:  40 mg methylPREDNISolone 40 MG/ML; 4 mL ropivacaine 5 MG/ML; 5 mL lidocaine 1 %  Aspirate amount (mL):  17  Aspirate:  Yellow and clear  Outcome:  Tolerated well, no immediate complications  Procedure discussed: discussed risks, benefits, and alternatives    Consent Given by:  Patient  Timeout: timeout called immediately prior to procedure    Prep: patient was prepped and draped in usual sterile fashion     Ultrasound was used to ensure safe and accurate needle placement and injection. Ultrasound images of the procedure were permanently stored.        Albert Yeo MD [facility]      Again, thank you for allowing me to participate in the care of your patient.         Sincerely,        Albert Yeo, MD

## 2023-10-16 ASSESSMENT — ASTHMA QUESTIONNAIRES: ACT_TOTALSCORE: 25

## 2023-10-19 ENCOUNTER — VIRTUAL VISIT (OUTPATIENT)
Dept: FAMILY MEDICINE | Facility: CLINIC | Age: 43
End: 2023-10-19
Payer: COMMERCIAL

## 2023-10-19 DIAGNOSIS — I10 ESSENTIAL HYPERTENSION: ICD-10-CM

## 2023-10-19 DIAGNOSIS — Z00.00 ROUTINE HISTORY AND PHYSICAL EXAMINATION OF ADULT: Primary | ICD-10-CM

## 2023-10-19 DIAGNOSIS — Z76.0 ENCOUNTER FOR MEDICATION REFILL: ICD-10-CM

## 2023-10-19 PROCEDURE — 99396 PREV VISIT EST AGE 40-64: CPT | Mod: VID | Performed by: INTERNAL MEDICINE

## 2023-10-19 NOTE — PROGRESS NOTES
"      How would you like to obtain your AVS? MyChart  If the video visit is dropped, the invitation should be resent by: Text to cell phone: [phone]  Will anyone else be joining your video visit? No  {If patient encounters technical issues they should call [phone] :059159}        {PROVIDER CHARTING PREFERENCE:220868}    Pat Clarke is a 43 year old, presenting for the following health issues:  No chief complaint on file.  {(!) Visit Details have not yet been documented.  Please enter Visit Details and then use this list to pull in documentation. (Optional):752554}    History of Present Illness       Hypertension: He presents for follow up of hypertension.  He does check blood pressure  regularly outside of the clinic. Outside blood pressures have been over 140/90. He does not follow a low salt diet.     He eats 2-3 servings of fruits and vegetables daily.He consumes 0 sweetened beverage(s) daily.He exercises with enough effort to increase his heart rate 20 to 29 minutes per day.  He exercises with enough effort to increase his heart rate 3 or less days per week.   He is taking medications regularly.       {SUPERLIST (Optional):366577}  {additonal problems for provider to add (Optional):440999}      Review of Systems   {ROS COMP (Optional):420385}      Objective    Vitals - Patient Reported  Weight (Patient Reported): 90.7 kg (200 lb)  Height (Patient Reported): 180.3 cm (5' 11\")  BMI (Based on Pt Reported Ht/Wt): 27.89  Pain Score: No Pain (0)        Physical Exam   {video visit exam brief selected:428629}    {Diagnostic Test Results (Optional):495682}    {AMBULATORY ATTESTATION (Optional):693109}        Video-Visit Details    Type of service:  Video Visit     Originating Location (pt. Location): {video visit patient location:393192::\"Home\"}  {PROVIDER LOCATION On-site should be selected for visits conducted from your clinic location or adjoining Health system hospital, academic office, or other nearby Health system " "building. Off-site should be selected for all other provider locations, including home:137274}  Distant Location (provider location):  {virtual location provider:431640}  Platform used for Video Visit: {Virtual Visit Platforms:500441::\"[employer]\"}      "

## 2023-10-20 RX ORDER — CHLORTHALIDONE 25 MG/1
50 TABLET ORAL DAILY
Qty: 180 TABLET | Refills: 3 | Status: SHIPPED | OUTPATIENT
Start: 2023-10-20 | End: 2024-08-14

## 2023-10-20 RX ORDER — AMLODIPINE BESYLATE 10 MG/1
10 TABLET ORAL DAILY
Qty: 90 TABLET | Refills: 3 | Status: SHIPPED | OUTPATIENT
Start: 2023-10-20 | End: 2024-08-14

## 2023-10-20 RX ORDER — LABETALOL 100 MG/1
400 TABLET, FILM COATED ORAL 2 TIMES DAILY
Qty: 720 TABLET | Refills: 3 | Status: SHIPPED | OUTPATIENT
Start: 2023-10-20 | End: 2024-08-14

## 2023-10-20 NOTE — PROGRESS NOTES
"SUBJECTIVE:   CC: Vince is an 43 year old who presents for preventative health visit.     HPI    Today's PHQ-2 Score:       10/19/2023     4:28 PM   PHQ-2 ( 1999 [employer])   Q1: Little interest or pleasure in doing things 0   Q2: Feeling down, depressed or hopeless 0   PHQ-2 Score 0   Q1: Little interest or pleasure in doing things Not at all   Q2: Feeling down, depressed or hopeless Not at all   PHQ-2 Score 0         Social History     Tobacco Use    Smoking status: Never    Smokeless tobacco: Never    Tobacco comments:     socially/ocassionally    Substance Use Topics    Alcohol use: Yes     Alcohol/week: 0.0 standard drinks of alcohol     Comment: occ       Last PSA: No results found for: \"PSA\"    Reviewed orders with patient. Reviewed health maintenance and updated orders accordingly - Yes  Labs reviewed in EPIC    Reviewed and updated as needed this visit by clinical staff   Tobacco  Allergies  Meds              Reviewed and updated as needed this visit by Provider                 Past Medical History:   Diagnosis Date    Hypertension     Hypertension goal BP (blood pressure) < 140/90 12/8/2014    Palpitations     Racing heart beat     Uncomplicated asthma         Review of Systems  CONSTITUTIONAL: NEGATIVE for fever, chills, change in weight  INTEGUMENTARY/SKIN: NEGATIVE for worrisome rashes, moles or lesions  EYES: NEGATIVE for vision changes or irritation  ENT: NEGATIVE for ear, mouth and throat problems  RESP: NEGATIVE for significant cough or SOB  CV: NEGATIVE for chest pain, palpitations or peripheral edema  GI: NEGATIVE for nausea, abdominal pain, heartburn, or change in bowel habits   male: negative for dysuria, hematuria, decreased urinary stream, erectile dysfunction, urethral discharge  MUSCULOSKELETAL: NEGATIVE for significant arthralgias or myalgia  NEURO: NEGATIVE for weakness, dizziness or paresthesias  PSYCHIATRIC: NEGATIVE for changes in mood or affect    OBJECTIVE:   There were no vitals " "taken for this visit.    Physical Exam  GENERAL: alert and no distress  EYES: Eyes grossly normal to inspection, conjunctivae and sclerae normal  HENT: nose and mouth without ulcers or lesions  NECK: no asymmetry  RESP: no cough present  MS: no gross musculoskeletal defects noted, no edema  SKIN: no visible suspicious lesions or rashes  NEURO: mentation intact and speech normal  PSYCH: affect normal/bright    Diagnostic Test Results:  Labs reviewed in Epic    ASSESSMENT/PLAN:   Diagnoses and all orders for this visit:    Routine history and physical examination of adult    Essential hypertension  -     labetalol (NORMODYNE) 100 MG tablet; Take 4 tablets (400 mg) by mouth 2 times daily  -     chlorthalidone (HYGROTON) 25 MG tablet; Take 2 tablets (50 mg) by mouth daily  -     amLODIPine (NORVASC) 10 MG tablet; Take 1 tablet (10 mg) by mouth daily    Encounter for medication refill    Other orders  -     REVIEW OF HEALTH MAINTENANCE PROTOCOL ORDERS        Patient has been advised of split billing requirements and indicates understanding: Yes      COUNSELING:   Reviewed preventive health counseling, as reflected in patient instructions  Special attention given to:        Regular exercise       Healthy diet/nutrition      BMI:   Estimated body mass index is 27.89 kg/m  as calculated from the following:    Height as of 7/26/23: 1.803 m (5' 11\").    Weight as of 7/26/23: 90.7 kg (200 lb).   Weight management plan: Discussed healthy diet and exercise guidelines      He reports that he has never smoked. He has never used smokeless tobacco.            Jaye Martinez MD  [facility]  "

## 2024-01-19 ENCOUNTER — TRANSFERRED RECORDS (OUTPATIENT)
Dept: HEALTH INFORMATION MANAGEMENT | Facility: CLINIC | Age: 44
End: 2024-01-19
Payer: COMMERCIAL

## 2024-08-14 ENCOUNTER — VIRTUAL VISIT (OUTPATIENT)
Dept: FAMILY MEDICINE | Facility: CLINIC | Age: 44
End: 2024-08-14
Payer: COMMERCIAL

## 2024-08-14 DIAGNOSIS — I10 ESSENTIAL HYPERTENSION: ICD-10-CM

## 2024-08-14 DIAGNOSIS — Z00.00 ROUTINE HISTORY AND PHYSICAL EXAMINATION OF ADULT: Primary | ICD-10-CM

## 2024-08-14 PROCEDURE — 99396 PREV VISIT EST AGE 40-64: CPT | Mod: 95 | Performed by: INTERNAL MEDICINE

## 2024-08-14 RX ORDER — AMLODIPINE BESYLATE 10 MG/1
10 TABLET ORAL DAILY
Qty: 90 TABLET | Refills: 3 | Status: SHIPPED | OUTPATIENT
Start: 2024-08-14

## 2024-08-14 RX ORDER — CHLORTHALIDONE 25 MG/1
50 TABLET ORAL DAILY
Qty: 180 TABLET | Refills: 3 | Status: SHIPPED | OUTPATIENT
Start: 2024-08-14

## 2024-08-14 RX ORDER — LABETALOL 100 MG/1
500 TABLET, FILM COATED ORAL 2 TIMES DAILY
Qty: 900 TABLET | Refills: 3 | Status: SHIPPED | OUTPATIENT
Start: 2024-08-14

## 2024-08-14 ASSESSMENT — ASTHMA QUESTIONNAIRES
ACT_TOTALSCORE: 25
QUESTION_3 LAST FOUR WEEKS HOW OFTEN DID YOUR ASTHMA SYMPTOMS (WHEEZING, COUGHING, SHORTNESS OF BREATH, CHEST TIGHTNESS OR PAIN) WAKE YOU UP AT NIGHT OR EARLIER THAN USUAL IN THE MORNING: NOT AT ALL
QUESTION_2 LAST FOUR WEEKS HOW OFTEN HAVE YOU HAD SHORTNESS OF BREATH: NOT AT ALL
ACT_TOTALSCORE: 25
QUESTION_5 LAST FOUR WEEKS HOW WOULD YOU RATE YOUR ASTHMA CONTROL: COMPLETELY CONTROLLED
QUESTION_4 LAST FOUR WEEKS HOW OFTEN HAVE YOU USED YOUR RESCUE INHALER OR NEBULIZER MEDICATION (SUCH AS ALBUTEROL): NOT AT ALL
QUESTION_1 LAST FOUR WEEKS HOW MUCH OF THE TIME DID YOUR ASTHMA KEEP YOU FROM GETTING AS MUCH DONE AT WORK, SCHOOL OR AT HOME: NONE OF THE TIME

## 2024-08-14 NOTE — PROGRESS NOTES
"Vince is a 44 year old who is being evaluated via a billable video visit.    How would you like to obtain your AVS? MyChart  If the video visit is dropped, the invitation should be resent by: Text to cell phone: [phone]  Will anyone else be joining your video visit? No         SUBJECTIVE:   CC: Vince is an 44 year old who presents for preventative health visit.     HPI    Hypertension: He presents for follow up of hypertension.  He does not check blood pressure  regularly outside of the clinic. Outpatient blood pressures have not been over 140/90. He does not follow a low salt diet.      He eats 2-3 servings of fruits and vegetables daily.He consumes 0 sweetened beverage(s) daily.He exercises with enough effort to increase his heart rate 10 to 19 minutes per day.  He exercises with enough effort to increase his heart rate 3 or less days per week.   He is taking medications regularly.            Today's PHQ-2 Score:       8/14/2024     6:28 AM   PHQ-2 ( 1999 Pf[employer])   Q1: Little interest or pleasure in doing things 0   Q2: Feeling down, depressed or hopeless 0   PHQ-2 Score 0   Q1: Little interest or pleasure in doing things Not at all   Q2: Feeling down, depressed or hopeless Not at all   PHQ-2 Score 0         Social History     Tobacco Use    Smoking status: Never    Smokeless tobacco: Never    Tobacco comments:     socially/ocassionally    Substance Use Topics    Alcohol use: Yes     Alcohol/week: 0.0 standard drinks of alcohol     Comment: occ       Last PSA: No results found for: \"PSA\"    Reviewed orders with patient. Reviewed health maintenance and updated orders accordingly - Yes  Labs reviewed in EPIC    Reviewed and updated as needed this visit by clinical staff   Tobacco  Allergies  Meds              Reviewed and updated as needed this visit by Provider                  Past Medical History:   Diagnosis Date    Hypertension     Hypertension goal BP (blood pressure) < 140/90 12/8/2014    Palpitations "     Racing heart beat     Uncomplicated asthma         Review of Systems  CONSTITUTIONAL: NEGATIVE for fever, chills, change in weight  INTEGUMENTARY/SKIN: NEGATIVE for worrisome rashes, moles or lesions  EYES: NEGATIVE for vision changes or irritation  ENT: NEGATIVE for ear, mouth and throat problems  RESP: NEGATIVE for significant cough or SOB  CV: NEGATIVE for chest pain, palpitations or peripheral edema  GI: NEGATIVE for nausea, abdominal pain, heartburn, or change in bowel habits   male: negative for dysuria, hematuria, decreased urinary stream, erectile dysfunction, urethral discharge  MUSCULOSKELETAL: NEGATIVE for significant arthralgias or myalgia  NEURO: NEGATIVE for weakness, dizziness or paresthesias  PSYCHIATRIC: NEGATIVE for changes in mood or affect    OBJECTIVE:   There were no vitals taken for this visit.    Physical Exam  GENERAL: alert and no distress  EYES: Eyes grossly normal to inspection, conjunctivae and sclerae normal  HENT: nose and mouth without ulcers or lesions  NECK: no asymmetry  RESP: no cough present  MS: no gross musculoskeletal defects noted, no edema  SKIN: no visible suspicious lesions or rashes  NEURO: mentation intact and speech normal  PSYCH: affect normal/bright    Diagnostic Test Results:  Labs reviewed in Epic    ASSESSMENT/PLAN:   Vince was seen today for recheck medication and hypertension.    Diagnoses and all orders for this visit:    Routine history and physical examination of adult  Essential hypertension  -     labetalol (NORMODYNE) 100 MG tablet; Take 5 tablets (500 mg) by mouth 2 times daily  -     chlorthalidone (HYGROTON) 25 MG tablet; Take 2 tablets (50 mg) by mouth daily  -     amLODIPine (NORVASC) 10 MG tablet; Take 1 tablet (10 mg) by mouth daily        Patient has been advised of split billing requirements and indicates understanding: Yes      COUNSELING:   Reviewed preventive health counseling, as reflected in patient instructions  Special attention given  "to:        Regular exercise       Healthy diet/nutrition      BMI:   Estimated body mass index is 27.89 kg/m  as calculated from the following:    Height as of 7/26/23: 1.803 m (5' 11\").    Weight as of 7/26/23: 90.7 kg (200 lb).   Weight management plan: Discussed healthy diet and exercise guidelines      He reports that he has never smoked. He has never used smokeless tobacco.              Jaye Martinez MD  [employer]       Video-Visit Details     Type of service:  Video Visit   Originating Location (pt. Location): Home    Distant Location (provider location):  On-site  Platform used for Video Visit: [facility]  Signed Electronically by: Jaye Martinez MD  "

## 2024-08-14 NOTE — PROGRESS NOTES
"Vince is a 44 year old who is being evaluated via a billable video visit.    How would you like to obtain your AVS? MyChart  If the video visit is dropped, the invitation should be resent by: Text to cell phone: [phone]  Will anyone else be joining your video visit? No  {If patient encounters technical issues they should call [phone] :956408}    {PROVIDER CHARTING PREFERENCE:095216}    Subjective   Vince is a 44 year old, presenting for the following health issues:  Recheck Medication and Hypertension  {(!) Visit Details have not yet been documented.  Please enter Visit Details and then use this list to pull in documentation. (Optional):038018}  History of Present Illness       Hypertension: He presents for follow up of hypertension.  He does not check blood pressure  regularly outside of the clinic. Outpatient blood pressures have not been over 140/90. He does not follow a low salt diet.     He eats 2-3 servings of fruits and vegetables daily.He consumes 0 sweetened beverage(s) daily.He exercises with enough effort to increase his heart rate 10 to 19 minutes per day.  He exercises with enough effort to increase his heart rate 3 or less days per week.   He is taking medications regularly.       {SUPERLIST (Optional):643325}  {additonal problems for provider to add (Optional):758120}    {ROS Picklists (Optional):533516}      Objective           Vitals:  No vitals were obtained today due to virtual visit.    Physical Exam   {video visit exam brief selected:754488}    {Diagnostic Test Results (Optional):165545}      Video-Visit Details    Type of service:  Video Visit   Originating Location (pt. Location): {video visit patient location:130974::\"Home\"}  {PROVIDER LOCATION On-site should be selected for visits conducted from your clinic location or adjoining [facility] hospital, academic office, or other nearby [facility] building. Off-site should be selected for all other provider locations, including " "home:131752}  Distant Location (provider location):  {virtual location provider:870281}  Platform used for Video Visit: {Virtual Visit Platforms:872256::\"[employer]\"}  Signed Electronically by: Jaye Martinez MD  {Email feedback regarding this note to [email or web address]   :537557}  "

## 2024-08-15 ENCOUNTER — OFFICE VISIT (OUTPATIENT)
Dept: FAMILY MEDICINE | Facility: CLINIC | Age: 44
End: 2024-08-15
Payer: COMMERCIAL

## 2024-08-15 VITALS
TEMPERATURE: 97.3 F | SYSTOLIC BLOOD PRESSURE: 142 MMHG | HEART RATE: 57 BPM | DIASTOLIC BLOOD PRESSURE: 93 MMHG | BODY MASS INDEX: 27.36 KG/M2 | RESPIRATION RATE: 16 BRPM | WEIGHT: 196.2 LBS | OXYGEN SATURATION: 97 %

## 2024-08-15 DIAGNOSIS — H60.392 OTHER INFECTIVE ACUTE OTITIS EXTERNA OF LEFT EAR: ICD-10-CM

## 2024-08-15 DIAGNOSIS — H66.002 NON-RECURRENT ACUTE SUPPURATIVE OTITIS MEDIA OF LEFT EAR WITHOUT SPONTANEOUS RUPTURE OF TYMPANIC MEMBRANE: Primary | ICD-10-CM

## 2024-08-15 PROCEDURE — 99213 OFFICE O/P EST LOW 20 MIN: CPT

## 2024-08-15 NOTE — PROGRESS NOTES
Assessment & Plan     Non-recurrent acute suppurative otitis media of left ear without spontaneous rupture of tympanic membrane    - amoxicillin-clavulanate (AUGMENTIN) 875-125 MG tablet; Take 1 tablet by mouth 2 times daily for 7 days    Other infective acute otitis externa of left ear    - amoxicillin-clavulanate (AUGMENTIN) 875-125 MG tablet; Take 1 tablet by mouth 2 times daily for 7 days    Left canal with erythema and swelling. Left TM dull and pink. Likely AOM in the beginning stages. Will treat with oral Augmentin to cover both.   May use Tylenol and IBU if needed for discomfort.         Return in about 1 week (around 8/22/2024), or if symptoms worsen or fail to improve.    Subjective   Vince is a 44 year old, presenting for the following health issues:  Otalgia (Left ear soreness for about a week. Pain started yesterday, left ear feels plugged.)    HPI   Vince presents with left ear discomfort for past week. Feels full and is painful when he touches it. Had some clear yellow fluid come out of the ear as well.  3 weeks ago had a viral URI that resolved on its own.  Hx of tubes as a child and ruptured TM as a child. No issues as an adult.       Review of Systems  Constitutional, HEENT, cardiovascular, pulmonary, gi and gu systems are negative, except as otherwise noted.      Objective    BP (!) 142/93 (BP Location: Right arm, Patient Position: Sitting, Cuff Size: Adult Regular)   Pulse 57   Temp 97.3  F (36.3  C) (Tympanic)   Resp 16   Wt 89 kg (196 lb 3.2 oz)   SpO2 97%   BMI 27.36 kg/m    Body mass index is 27.36 kg/m .  Physical Exam   GENERAL: alert and no distress  EYES: Eyes grossly normal to inspection, PERRL and conjunctivae and sclerae normal  HENT: normal cephalic/atraumatic, right ear: normal: no effusions, no erythema, normal landmarks, left ear: erythematous, TM dull, and red and boggy canal, nose and mouth without ulcers or lesions, oropharynx clear, and oral mucous membranes  moist  NECK: no adenopathy  RESP: lungs clear to auscultation - no rales, rhonchi or wheezes  CV: regular rates and rhythm, normal S1 S2, no S3 or S4, and no murmur, click or rub            Signed Electronically by:  [facility]

## 2025-05-12 ENCOUNTER — E-VISIT (OUTPATIENT)
Dept: URGENT CARE | Facility: CLINIC | Age: 45
End: 2025-05-12
Payer: COMMERCIAL

## 2025-05-12 DIAGNOSIS — J01.90 ACUTE SINUSITIS WITH SYMPTOMS > 10 DAYS: Primary | ICD-10-CM

## 2025-05-12 NOTE — PATIENT INSTRUCTIONS
Dear Vince Hatch    After reviewing your responses, I've been able to diagnose you with Acute sinusitis with symptoms > 10 days.      Based on your responses and diagnosis, I have prescribed   Orders Placed This Encounter   Medications     amoxicillin-clavulanate (AUGMENTIN) 875-125 MG tablet     Sig: Take 1 tablet by mouth 2 times daily for 7 days.     Dispense:  14 tablet     Refill:  0    to treat your symptoms. I have sent this to your pharmacy.?     Augmentin is prescribed which is a strong antibiotic recommended for sinus infections.  Strong antibiotics can make people prone to antibiotic associated diarrhea - one of which is Clostridium Difficile.  If you develop diarrhea- please stop the antibiotic and consult with your primary care provider    If your ear discomfort persists I recommend being evaluated in person  If you have concerns about hearing loss/decreased hearing - I recommend being evaluated in person ASAP    It is also important to stay well hydrated, get lots of rest and take over-the-counter decongestants,?tylenol?or ibuprofen if you?are able to?take those medications per your primary care provider to help relieve discomfort.?     It is important that you take?all of?your prescribed medication even if your symptoms are improving after a few doses.? Taking?all of?your medicine helps prevent the symptoms from returning.?     If your symptoms worsen, you develop severe headache, vomiting, high fever (>102), or are not improving in 7 days, please contact your primary care provider for an appointment or visit any of our convenient Walk-in Care or Urgent Care Centers to be seen which can be found on our website?here.?     Thanks again for choosing?us?as your health care partner,?   ?  Patti Ruelas MD?

## 2025-07-15 ENCOUNTER — PATIENT OUTREACH (OUTPATIENT)
Dept: CARE COORDINATION | Facility: CLINIC | Age: 45
End: 2025-07-15
Payer: COMMERCIAL

## 2025-07-29 ENCOUNTER — PATIENT OUTREACH (OUTPATIENT)
Dept: CARE COORDINATION | Facility: CLINIC | Age: 45
End: 2025-07-29
Payer: COMMERCIAL

## 2025-09-02 ASSESSMENT — ASTHMA QUESTIONNAIRES
QUESTION_2 LAST FOUR WEEKS HOW OFTEN HAVE YOU HAD SHORTNESS OF BREATH: NOT AT ALL
QUESTION_1 LAST FOUR WEEKS HOW MUCH OF THE TIME DID YOUR ASTHMA KEEP YOU FROM GETTING AS MUCH DONE AT WORK, SCHOOL OR AT HOME: NONE OF THE TIME
QUESTION_4 LAST FOUR WEEKS HOW OFTEN HAVE YOU USED YOUR RESCUE INHALER OR NEBULIZER MEDICATION (SUCH AS ALBUTEROL): NOT AT ALL
ACT_TOTALSCORE: 25
QUESTION_5 LAST FOUR WEEKS HOW WOULD YOU RATE YOUR ASTHMA CONTROL: COMPLETELY CONTROLLED
QUESTION_3 LAST FOUR WEEKS HOW OFTEN DID YOUR ASTHMA SYMPTOMS (WHEEZING, COUGHING, SHORTNESS OF BREATH, CHEST TIGHTNESS OR PAIN) WAKE YOU UP AT NIGHT OR EARLIER THAN USUAL IN THE MORNING: NOT AT ALL

## 2025-09-03 ENCOUNTER — VIRTUAL VISIT (OUTPATIENT)
Dept: FAMILY MEDICINE | Facility: CLINIC | Age: 45
End: 2025-09-03
Payer: COMMERCIAL

## 2025-09-03 DIAGNOSIS — I10 ESSENTIAL HYPERTENSION: ICD-10-CM

## 2025-09-03 PROCEDURE — 98005 SYNCH AUDIO-VIDEO EST LOW 20: CPT | Performed by: PHYSICIAN ASSISTANT

## 2025-09-03 RX ORDER — AMLODIPINE BESYLATE 10 MG/1
10 TABLET ORAL DAILY
Qty: 90 TABLET | Refills: 0 | Status: SHIPPED | OUTPATIENT
Start: 2025-09-03

## 2025-09-03 RX ORDER — CHLORTHALIDONE 25 MG/1
50 TABLET ORAL DAILY
Qty: 180 TABLET | Refills: 0 | Status: SHIPPED | OUTPATIENT
Start: 2025-09-03

## 2025-09-03 RX ORDER — LABETALOL 100 MG/1
500 TABLET, FILM COATED ORAL 2 TIMES DAILY
Qty: 900 TABLET | Refills: 0 | Status: SHIPPED | OUTPATIENT
Start: 2025-09-03